# Patient Record
Sex: FEMALE | Employment: FULL TIME | URBAN - METROPOLITAN AREA
[De-identification: names, ages, dates, MRNs, and addresses within clinical notes are randomized per-mention and may not be internally consistent; named-entity substitution may affect disease eponyms.]

---

## 2022-06-21 ENCOUNTER — HOSPITAL ENCOUNTER (OUTPATIENT)
Age: 32
Discharge: HOME | End: 2022-06-21

## 2022-06-21 LAB — RUBV IGG SER QL: 100.5 IU/ML

## 2022-06-21 PROCEDURE — 86787 VARICELLA-ZOSTER ANTIBODY: CPT

## 2022-06-21 PROCEDURE — 86765 RUBEOLA ANTIBODY: CPT

## 2022-06-21 PROCEDURE — 36415 COLL VENOUS BLD VENIPUNCTURE: CPT

## 2022-06-21 PROCEDURE — 86735 MUMPS ANTIBODY: CPT

## 2022-06-21 PROCEDURE — 86481 TB AG RESPONSE T-CELL SUSP: CPT

## 2022-06-21 PROCEDURE — 86762 RUBELLA ANTIBODY: CPT

## 2022-06-22 LAB
MEASLES ANTIBODY IGG: 3.32
MUV IGG SER QL: 5.3
VZV IGG SER QL IA: 2.29

## 2022-06-25 LAB — T-SPOT TB TEST: NORMAL

## 2023-09-05 ENCOUNTER — HOSPITAL ENCOUNTER (OUTPATIENT)
Age: 33
Setting detail: SPECIMEN
Discharge: HOME | End: 2023-09-05
Payer: COMMERCIAL

## 2023-09-05 ENCOUNTER — OFFICE VISIT (OUTPATIENT)
Dept: OBGYN CLINIC | Age: 33
End: 2023-09-05
Payer: COMMERCIAL

## 2023-09-05 VITALS
DIASTOLIC BLOOD PRESSURE: 70 MMHG | HEIGHT: 64 IN | BODY MASS INDEX: 23.39 KG/M2 | SYSTOLIC BLOOD PRESSURE: 108 MMHG | WEIGHT: 137 LBS

## 2023-09-05 DIAGNOSIS — Z83.3 FAMILY HISTORY OF DIABETES MELLITUS (DM): ICD-10-CM

## 2023-09-05 DIAGNOSIS — Z32.01 POSITIVE PREGNANCY TEST: ICD-10-CM

## 2023-09-05 DIAGNOSIS — Z87.42 HISTORY OF PCOS: ICD-10-CM

## 2023-09-05 DIAGNOSIS — Z3A.10 10 WEEKS GESTATION OF PREGNANCY: ICD-10-CM

## 2023-09-05 DIAGNOSIS — N92.6 MISSED MENSES: ICD-10-CM

## 2023-09-05 DIAGNOSIS — Z32.01 POSITIVE PREGNANCY TEST: Primary | ICD-10-CM

## 2023-09-05 DIAGNOSIS — O21.9 NAUSEA/VOMITING IN PREGNANCY: ICD-10-CM

## 2023-09-05 LAB
ABO + RH BLD: NORMAL
BASOPHILS # BLD: <0.03 K/UL (ref 0–0.2)
BASOPHILS NFR BLD: 0 % (ref 0–2)
BLOOD GROUP ANTIBODIES SERPL: NEGATIVE
EOSINOPHIL # BLD: 0.15 K/UL (ref 0–0.44)
EOSINOPHILS RELATIVE PERCENT: 1 % (ref 1–4)
ERYTHROCYTE [DISTWIDTH] IN BLOOD BY AUTOMATED COUNT: 12.2 % (ref 11.8–14.4)
HCT VFR BLD AUTO: 38.8 % (ref 36.3–47.1)
HGB BLD-MCNC: 13.2 G/DL (ref 11.9–15.1)
HISTORY CHECK: NORMAL
HIV 1+2 AB+HIV1 P24 AG SERPL QL IA: NONREACTIVE
IMM GRANULOCYTES # BLD AUTO: 0.04 K/UL (ref 0–0.3)
IMM GRANULOCYTES NFR BLD: 0 %
LYMPHOCYTES NFR BLD: 2.58 K/UL (ref 1.1–3.7)
LYMPHOCYTES RELATIVE PERCENT: 22 % (ref 24–43)
MCH RBC QN AUTO: 32.1 PG (ref 25.2–33.5)
MCHC RBC AUTO-ENTMCNC: 34 G/DL (ref 28.4–34.8)
MCV RBC AUTO: 94.4 FL (ref 82.6–102.9)
MONOCYTES NFR BLD: 0.72 K/UL (ref 0.1–1.2)
MONOCYTES NFR BLD: 6 % (ref 3–12)
NEUTROPHILS NFR BLD: 71 % (ref 36–65)
NEUTS SEG NFR BLD: 8.25 K/UL (ref 1.5–8.1)
NRBC BLD-RTO: 0 PER 100 WBC
PLATELET # BLD AUTO: 395 K/UL (ref 138–453)
PMV BLD AUTO: 9 FL (ref 8.1–13.5)
RBC # BLD AUTO: 4.11 M/UL (ref 3.95–5.11)
WBC OTHER # BLD: 11.8 K/UL (ref 3.5–11.3)

## 2023-09-05 PROCEDURE — 99214 OFFICE O/P EST MOD 30 MIN: CPT

## 2023-09-05 RX ORDER — LANOLIN ALCOHOL/MO/W.PET/CERES
25 CREAM (GRAM) TOPICAL 3 TIMES DAILY PRN
Qty: 30 TABLET | Refills: 3 | Status: SHIPPED | OUTPATIENT
Start: 2023-09-05

## 2023-09-05 RX ORDER — ONDANSETRON 4 MG/1
TABLET, ORALLY DISINTEGRATING ORAL
COMMUNITY
Start: 2023-08-28

## 2023-09-05 RX ORDER — FAMOTIDINE 20 MG/1
TABLET, FILM COATED ORAL
COMMUNITY
Start: 2023-08-28

## 2023-09-05 ASSESSMENT — ENCOUNTER SYMPTOMS
CONSTIPATION: 1
VOMITING: 1
NAUSEA: 1

## 2023-09-05 NOTE — PROGRESS NOTES
SAB        IAB        Ectopic        Molar        Multiple        Live Births                   Past Medical History:   Diagnosis Date    Sciatica 2021     No past surgical history on file. Social History     Tobacco Use   Smoking Status Never   Smokeless Tobacco Never     Social History     Substance and Sexual Activity   Alcohol Use Not Currently     Current Outpatient Medications   Medication Sig Dispense Refill    vitamin B-6 (PYRIDOXINE) 50 MG tablet Take 0.5 tablets by mouth 3 times daily as needed (nausea, vomiting) 30 tablet 3    doxyLAMINE succinate (GNP SLEEP AID) 25 MG tablet Take 1 tablet by mouth nightly as needed for Sleep (or nausea) 30 tablet 1    famotidine (PEPCID) 20 MG tablet       ondansetron (ZOFRAN-ODT) 4 MG disintegrating tablet        No current facility-administered medications for this visit. Current BMI: 18.5-24.9 - Normal - Reviewed recommendations for weight gain in pregnancy. ALLERGIES:  Patient has no known allergies. Review of Systems   Constitutional:  Negative for chills and fever. Gastrointestinal:  Positive for constipation, nausea and vomiting (vomited 3-4 times this morning). Zofran is helpful. She does not need refills at this time   Genitourinary:  Positive for vaginal bleeding (small spotting with pap collection) and vaginal discharge. Negative for difficulty urinating, dysuria, frequency and pelvic pain. Neurological:  Negative for headaches. All other systems reviewed and are negative. Physical Exam  Constitutional:       General: She is not in acute distress. Appearance: Normal appearance. She is well-groomed. She is not ill-appearing. Comments: Pleasant and interactive   Genitourinary:      Vulva and urethral meatus normal.      No lesions in the vagina. Right Labia: No tenderness, lesions or skin changes. Left Labia: No tenderness, lesions or skin changes. Vaginal discharge present.       No vaginal tenderness or

## 2023-09-06 LAB
AMPHET UR QL SCN: NEGATIVE
BARBITURATES UR QL SCN: NEGATIVE
BENZODIAZ UR QL: NEGATIVE
BILIRUB UR QL STRIP: NEGATIVE
C TRACH DNA SPEC QL PROBE+SIG AMP: NEGATIVE
CANDIDA SPECIES: NEGATIVE
CANNABINOIDS UR QL SCN: NEGATIVE
CASTS #/AREA URNS LPF: NORMAL /LPF (ref 0–8)
CLARITY UR: ABNORMAL
COCAINE UR QL SCN: NEGATIVE
COLOR UR: ABNORMAL
EPI CELLS #/AREA URNS HPF: NORMAL /HPF (ref 0–5)
FENTANYL UR QL: NEGATIVE
GARDNERELLA VAGINALIS: NEGATIVE
GLUCOSE UR STRIP-MCNC: NEGATIVE MG/DL
HBV SURFACE AG SERPL QL IA: NONREACTIVE
HCV AB SERPL QL IA: NONREACTIVE
HGB UR QL STRIP.AUTO: NEGATIVE
KETONES UR STRIP-MCNC: NEGATIVE MG/DL
LEUKOCYTE ESTERASE UR QL STRIP: ABNORMAL
METHADONE UR QL: NEGATIVE
MICROORGANISM SPEC CULT: NO GROWTH
N GONORRHOEA DNA SPEC QL PROBE+SIG AMP: NEGATIVE
NITRITE UR QL STRIP: NEGATIVE
OPIATES UR QL SCN: NEGATIVE
OXYCODONE UR QL SCN: NEGATIVE
PCP UR QL SCN: NEGATIVE
PH UR STRIP: 5.5 [PH] (ref 5–8)
PROT UR STRIP-MCNC: NEGATIVE MG/DL
RBC #/AREA URNS HPF: NORMAL /HPF (ref 0–4)
RUBV IGG SERPL QL IA: 44.63 IU/ML
SOURCE: NORMAL
SP GR UR STRIP: 1.03 (ref 1–1.03)
SPECIMEN DESCRIPTION: NORMAL
SPECIMEN DESCRIPTION: NORMAL
T PALLIDUM AB SER QL IA: NONREACTIVE
TEST INFORMATION: NORMAL
TRICHOMONAS: NEGATIVE
UROBILINOGEN UR STRIP-ACNC: NORMAL EU/DL (ref 0–1)
WBC #/AREA URNS HPF: NORMAL /HPF (ref 0–5)

## 2023-09-11 LAB
CFTR ALLELE 1 BLD/T QL: NEGATIVE
CFTR ALLELE 2 BLD/T QL: NEGATIVE
CFTR MUT ANL BLD/T: NORMAL
CFTR MUT ANL BLD/T: NORMAL

## 2023-09-12 DIAGNOSIS — Z12.4 CERVICAL CANCER SCREENING: Primary | ICD-10-CM

## 2023-09-20 ENCOUNTER — INITIAL PRENATAL (OUTPATIENT)
Dept: OBGYN CLINIC | Age: 33
End: 2023-09-20

## 2023-09-20 ENCOUNTER — HOSPITAL ENCOUNTER (OUTPATIENT)
Age: 33
Setting detail: SPECIMEN
Discharge: HOME | End: 2023-09-20

## 2023-09-20 VITALS
WEIGHT: 142.6 LBS | BODY MASS INDEX: 24.34 KG/M2 | SYSTOLIC BLOOD PRESSURE: 122 MMHG | DIASTOLIC BLOOD PRESSURE: 72 MMHG | HEIGHT: 64 IN

## 2023-09-20 DIAGNOSIS — Z83.3 FH: DIABETES MELLITUS: ICD-10-CM

## 2023-09-20 DIAGNOSIS — Z82.49 FAMILY HISTORY OF HYPERTENSION IN MOTHER: ICD-10-CM

## 2023-09-20 DIAGNOSIS — Z83.3 FAMILY HISTORY OF DIABETES MELLITUS (DM): ICD-10-CM

## 2023-09-20 DIAGNOSIS — Z3A.10 10 WEEKS GESTATION OF PREGNANCY: ICD-10-CM

## 2023-09-20 DIAGNOSIS — Z34.01 PRIMIPARITY, FIRST TRIMESTER: ICD-10-CM

## 2023-09-20 DIAGNOSIS — Z83.49 FAMILY HISTORY OF G6PD: ICD-10-CM

## 2023-09-20 DIAGNOSIS — Z3A.12 12 WEEKS GESTATION OF PREGNANCY: Primary | ICD-10-CM

## 2023-09-20 PROBLEM — Z83.2 FAMILY HISTORY OF G6PD: Status: ACTIVE | Noted: 2023-09-20

## 2023-09-20 LAB
GLUCOSE 1H P 50 G GLC PO SERPL-MCNC: 131 MG/DL (ref 70–135)
GLUCOSE ADMINISTRATION: NORMAL

## 2023-09-20 RX ORDER — DOCUSATE SODIUM 100 MG/1
100 CAPSULE, LIQUID FILLED ORAL DAILY PRN
COMMUNITY

## 2023-09-20 NOTE — PROGRESS NOTES
Relationship with FOB: , living together, first pregnancy together  Partner's name:Trish   Plans to Breast fdg  Pain Score:0/10 but occass ligament pain  Job title:RN   This is a planned pregnancy:Yes  Certain LMP:Yes  S/S of pregnancy:Yes, missed period, breast tenderness and nausea  Hx N/V pregnancy:Nausea and vomiting. Hx vomiting everything early on but improving. Last episode of emesis yesterday. Mother's ethnicity:   Father's ethnicity:      There is no problem list on file for this patient. Blood pressure 122/72, height 5' 4\" (1.626 m), weight 142 lb 9.6 oz (64.7 kg), last menstrual period 2023. Arpan Hanson is a 35 y.o. , here for her ACOG. The patients past medical, surgical, social and family history were reviewed. Current medications and allergies were reviewed, and documented in the chart. Menstrual history: irreg, due to PCOS  Birth control: none. Wt Readings from Last 3 Encounters:   23 142 lb 9.6 oz (64.7 kg)   23 137 lb (62.1 kg)     Recent Results (from the past 8736 hour(s))   GYN Cytology    Collection Time: 23 12:00 AM   Result Value Ref Range    Cytology Report       Path Number: [id number]    DIAGNOSIS    Imaged ThinPrep Pap - Cervical (1 monolayer slide):  Specimen Adequacy:       Satisfactory for evaluation.       - Endocervical/transformation zone component present. Descriptive Diagnosis:       Negative for intraepithelial lesion or malignancy. Comments:       Specimen was screened at [facility], [address] [address]      [employer] Screener:  [place]     **Electronically Signed OutJuMARY CARMEN Howard(ASCP)  [place]/9/10/2023    Procedure/Addendum  HPV Procedure Report       Date Ordered:     2023     Status: Signed Out       Date Complete:     9/15/2023     By: System Interface       Date Reported:     9/15/2023              Sample:  HPV Type 16      Result:   Not Detected      Ref

## 2023-09-26 LAB
Lab: NORMAL
NTRA FETAL FRACTION: NORMAL
NTRA GENDER OF FETUS: NORMAL
NTRA MONOSOMY X AGE-BASED RISK TEXT: NORMAL
NTRA MONOSOMY X RESULT TEXT: NORMAL
NTRA MONOSOMY X RISK SCORE TEXT: NORMAL
NTRA TRIPLOIDY RESULT TEXT: NORMAL
NTRA TRISOMY 13 AGE-BASED RISK TEXT: NORMAL
NTRA TRISOMY 13 RESULT TEXT: NORMAL
NTRA TRISOMY 13 RISK SCORE TEXT: NORMAL
NTRA TRISOMY 18 AGE-BASED RISK TEXT: NORMAL
NTRA TRISOMY 18 RESULT TEXT: NORMAL
NTRA TRISOMY 18 RISK SCORE TEXT: NORMAL
NTRA TRISOMY 21 AGE-BASED RISK TEXT: NORMAL
NTRA TRISOMY 21 RESULT TEXT: NORMAL
NTRA TRISOMY 21 RISK SCORE TEXT: NORMAL

## 2023-09-28 ENCOUNTER — TELEPHONE (OUTPATIENT)
Dept: OBGYN CLINIC | Age: 33
End: 2023-09-28

## 2023-09-28 NOTE — TELEPHONE ENCOUNTER
13wks 6ds    Pt informed on results of nipit test and 1hr glucose.     Pt had verbal understanding of results     Pt informed of 3 hr glucose test and told to fast from midnight the night before

## 2023-10-05 ENCOUNTER — HOSPITAL ENCOUNTER (OUTPATIENT)
Age: 33
Setting detail: SPECIMEN
Discharge: HOME | End: 2023-10-05

## 2023-10-05 DIAGNOSIS — R73.09 GLUCOSE TOLERANCE TEST ABNORMAL: ICD-10-CM

## 2023-10-06 LAB
AMOUNT GLUCOSE GIVEN: 100 G
GLUCOSE 2H P 75 G GLC PO SERPL-MCNC: 83 MG/DL (ref 65–99)
GLUCOSE TOLERANCE TEST 1 HOUR: 181 MG/DL (ref 65–184)
GLUCOSE TOLERANCE TEST 2 HOUR: 127 MG/DL (ref 65–139)
GLUCOSE TOLERANCE TEST 3 HOUR: 91 MG/DL (ref 65–130)

## 2023-10-23 SDOH — ECONOMIC STABILITY: FOOD INSECURITY: WITHIN THE PAST 12 MONTHS, THE FOOD YOU BOUGHT JUST DIDN'T LAST AND YOU DIDN'T HAVE MONEY TO GET MORE.: NEVER TRUE

## 2023-10-23 SDOH — ECONOMIC STABILITY: INCOME INSECURITY: HOW HARD IS IT FOR YOU TO PAY FOR THE VERY BASICS LIKE FOOD, HOUSING, MEDICAL CARE, AND HEATING?: NOT VERY HARD

## 2023-10-23 SDOH — ECONOMIC STABILITY: HOUSING INSECURITY
IN THE LAST 12 MONTHS, WAS THERE A TIME WHEN YOU DID NOT HAVE A STEADY PLACE TO SLEEP OR SLEPT IN A SHELTER (INCLUDING NOW)?: NO

## 2023-10-23 SDOH — ECONOMIC STABILITY: TRANSPORTATION INSECURITY
IN THE PAST 12 MONTHS, HAS LACK OF TRANSPORTATION KEPT YOU FROM MEETINGS, WORK, OR FROM GETTING THINGS NEEDED FOR DAILY LIVING?: NO

## 2023-10-23 SDOH — ECONOMIC STABILITY: FOOD INSECURITY: WITHIN THE PAST 12 MONTHS, YOU WORRIED THAT YOUR FOOD WOULD RUN OUT BEFORE YOU GOT MONEY TO BUY MORE.: NEVER TRUE

## 2023-10-24 PROBLEM — Z34.90 SUPERVISION OF NORMAL PREGNANCY: Status: ACTIVE | Noted: 2023-10-24

## 2023-10-24 NOTE — PROGRESS NOTES
Tamiko Banerjee is a  @ 17w5d who presents for TIANA visit. She denies LOF, VB or Ctxs.  - FM. She says her nausea is much better and her GERD has resolved and she hasn't needed any meds. She says she feels much better overall, but is having some sciatic pain. She denies any fevers/chills, SOB, cough, sore throat, loss of taste/smell or sick contacts. Pt denies any HA, vision changes or RUQ pain. O:  Vitals:    10/25/23 0932   BP: 113/69   Pulse: 69     Gen: NAD  Abd: soft, nontender, gravid  Ext:  no edema      BP: 113/69  Weight - Scale: 66.7 kg (147 lb)  Pulse: 69  Patient Position: Sitting  Fetal HR: 140  Movement: Absent    A/P:  Patient Active Problem List    Diagnosis Date Noted    Family history of G6PD 2023     Priority: Low     OB EDC: 24  FH and pt desires testing at [facility] MFM:referral placed 23      Supervision of normal pregnancy 10/24/2023     Discussed updated COVID precautions and policies. Reviewed updated visitor policy. Encouraged social distancing and appropriate hand washing/hygiene practices. Reviewed symptoms suspicious for COVID infection. Discussed that ACOG, SMFM, and the CDC recommend to not withold immunization in pregnant and breastfeeding women who meet criteria for receipt of the vaccine based on the ACIP recommended priority groups. All questions answered. Patient vocalized understanding.     Encouraged pt to take baby ASA  Encouraged massage and exercises for sciatic nerve pain  Discussed s/sx that should prompt call to the office  Discussed kick counts  Pt counseled to continue PNVs  RTC in 4 wks    Vishnu Peña MD

## 2023-10-25 ENCOUNTER — ROUTINE PRENATAL (OUTPATIENT)
Dept: OBGYN CLINIC | Age: 33
End: 2023-10-25

## 2023-10-25 VITALS
DIASTOLIC BLOOD PRESSURE: 69 MMHG | HEART RATE: 69 BPM | BODY MASS INDEX: 25.23 KG/M2 | WEIGHT: 147 LBS | SYSTOLIC BLOOD PRESSURE: 113 MMHG

## 2023-10-25 DIAGNOSIS — Z34.02 ENCOUNTER FOR SUPERVISION OF NORMAL FIRST PREGNANCY IN SECOND TRIMESTER: ICD-10-CM

## 2023-10-25 DIAGNOSIS — Z3A.17 17 WEEKS GESTATION OF PREGNANCY: Primary | ICD-10-CM

## 2023-10-25 PROCEDURE — 0502F SUBSEQUENT PRENATAL CARE: CPT | Performed by: OBSTETRICS & GYNECOLOGY

## 2023-11-11 PROBLEM — Z83.3 FAMILY HISTORY OF DIABETES MELLITUS: Status: ACTIVE | Noted: 2023-11-11

## 2023-11-11 NOTE — PROGRESS NOTES
Prenatal Visit    Chante Polanco is a 35 y.o. female  at [address]    The patient was seen and evaluated. She has no complaints today. Having some insomnia. 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 Reports fetal flutters. She denies headache, vision changes, RUQ pain, contractions, vaginal bleeding and leakage of fluid. Still taking her vitamins daily. The problem list reflects the active issues addressed during today's visit    Vitals:    BP: (!) 96/57  Weight - Scale: 70.9 kg (156 lb 3.2 oz)  Pulse: 71  Fetal HR: 131     PHYSICAL:   General appearance: no apparent distress, alert and cooperative  HEENT: head atraumatic, normocephalic, trachea midline, moist mucous membranes   Neurologic: alert, oriented, normal speech   Lungs: no increased work of breathing,   Abdomen: soft, gravid, non-tender on palpation    Musculoskeletal: no gross abnormalities, range of motion appropriate for age   Psychiatric: mood appropriate, normal affect      Assessment & Plan:  Chante Polanco is a 35 y.o. female  at 24w10d IUP   - VSS     - Prenatal labs reviewed    - NIPT reviewed & low risk    - A single marker MSAFP was discussed and patient wants to wait until after her anatomy scan since that is scheduled today at 1400 with [facility]    - Continue taking prenatal vitamins QD    - Rhogam not indicated this pregnancy   - Influenza vaccination: due this season    - COVID-19 vaccination: R/B/A discussed with increased risk of both maternal and fetal morbidity and mortality in unvaccinated pregnant patients who contract COVID-19- patient is undecided today   - Discussed establishing healthy sleep habits to help get into a good routine    - Advise she can take 3 mg melatonin for a sleep aide   Return in about 4 weeks (around 2023) for TIANA.           Patient Active Problem List    Diagnosis Date Noted    Family history of G6PD 2023     Priority: Low     OB EDC: 24  FH and pt desires

## 2023-11-15 ENCOUNTER — ROUTINE PRENATAL (OUTPATIENT)
Dept: OBGYN CLINIC | Age: 33
End: 2023-11-15

## 2023-11-15 ENCOUNTER — ROUTINE PRENATAL (OUTPATIENT)
Dept: PERINATAL CARE | Age: 33
End: 2023-11-15
Payer: COMMERCIAL

## 2023-11-15 VITALS
SYSTOLIC BLOOD PRESSURE: 96 MMHG | BODY MASS INDEX: 26.81 KG/M2 | HEART RATE: 71 BPM | DIASTOLIC BLOOD PRESSURE: 57 MMHG | WEIGHT: 156.2 LBS

## 2023-11-15 VITALS
BODY MASS INDEX: 26.8 KG/M2 | RESPIRATION RATE: 16 BRPM | TEMPERATURE: 97.3 F | DIASTOLIC BLOOD PRESSURE: 71 MMHG | WEIGHT: 156.97 LBS | SYSTOLIC BLOOD PRESSURE: 122 MMHG | HEART RATE: 71 BPM | HEIGHT: 64 IN

## 2023-11-15 DIAGNOSIS — Z3A.20 20 WEEKS GESTATION OF PREGNANCY: ICD-10-CM

## 2023-11-15 DIAGNOSIS — Z36.86 ENCOUNTER FOR SCREENING FOR RISK OF PRE-TERM LABOR: ICD-10-CM

## 2023-11-15 DIAGNOSIS — Z83.3 FAMILY HISTORY OF DIABETES MELLITUS: ICD-10-CM

## 2023-11-15 DIAGNOSIS — O99.810 ABNORMAL MATERNAL GLUCOSE TOLERANCE, ANTEPARTUM: Primary | ICD-10-CM

## 2023-11-15 DIAGNOSIS — O09.92 HIGH-RISK PREGNANCY IN SECOND TRIMESTER: Primary | ICD-10-CM

## 2023-11-15 DIAGNOSIS — O35.2XX0 HEREDITARY FAMILIAL DISEASE AFFECTING MANAGEMENT OF MOTHER AND POSSIBLY AFFECTING FETUS, ANTEPARTUM, SINGLE OR UNSPECIFIED FETUS: ICD-10-CM

## 2023-11-15 LAB
ABDOMINAL CIRCUMFERENCE: NORMAL
ABDOMINAL CIRCUMFERENCE: NORMAL
BIPARIETAL DIAMETER: NORMAL
BIPARIETAL DIAMETER: NORMAL
ESTIMATED FETAL WEIGHT: NORMAL
ESTIMATED FETAL WEIGHT: NORMAL
FEMORAL DIAMETER: NORMAL
FEMORAL DIAMETER: NORMAL
HC/AC: NORMAL
HC/AC: NORMAL
HEAD CIRCUMFERENCE: NORMAL
HEAD CIRCUMFERENCE: NORMAL

## 2023-11-15 PROCEDURE — 99204 OFFICE O/P NEW MOD 45 MIN: CPT | Performed by: OBSTETRICS & GYNECOLOGY

## 2023-11-15 PROCEDURE — 76817 TRANSVAGINAL US OBSTETRIC: CPT | Performed by: OBSTETRICS & GYNECOLOGY

## 2023-11-15 PROCEDURE — 76811 OB US DETAILED SNGL FETUS: CPT | Performed by: OBSTETRICS & GYNECOLOGY

## 2023-11-15 PROCEDURE — 0502F SUBSEQUENT PRENATAL CARE: CPT | Performed by: STUDENT IN AN ORGANIZED HEALTH CARE EDUCATION/TRAINING PROGRAM

## 2023-12-26 ENCOUNTER — ROUTINE PRENATAL (OUTPATIENT)
Dept: OBGYN CLINIC | Age: 33
End: 2023-12-26

## 2023-12-26 VITALS
BODY MASS INDEX: 28.68 KG/M2 | HEART RATE: 73 BPM | SYSTOLIC BLOOD PRESSURE: 116 MMHG | HEIGHT: 64 IN | DIASTOLIC BLOOD PRESSURE: 64 MMHG | WEIGHT: 168 LBS

## 2023-12-26 DIAGNOSIS — O09.92 HIGH-RISK PREGNANCY IN SECOND TRIMESTER: Primary | ICD-10-CM

## 2023-12-26 DIAGNOSIS — R12 HEARTBURN DURING PREGNANCY IN THIRD TRIMESTER: ICD-10-CM

## 2023-12-26 DIAGNOSIS — Z3A.26 26 WEEKS GESTATION OF PREGNANCY: ICD-10-CM

## 2023-12-26 DIAGNOSIS — Z83.49 FAMILY HISTORY OF G6PD: ICD-10-CM

## 2023-12-26 DIAGNOSIS — O26.893 HEARTBURN DURING PREGNANCY IN THIRD TRIMESTER: ICD-10-CM

## 2023-12-26 PROCEDURE — 0502F SUBSEQUENT PRENATAL CARE: CPT | Performed by: STUDENT IN AN ORGANIZED HEALTH CARE EDUCATION/TRAINING PROGRAM

## 2023-12-26 RX ORDER — CALCIUM CARBONATE 500 MG/1
1 TABLET, CHEWABLE ORAL DAILY
Qty: 30 TABLET | Refills: 6 | Status: SHIPPED | OUTPATIENT
Start: 2023-12-26

## 2023-12-26 NOTE — PROGRESS NOTES
Prenatal Visit    Suzanna Powell is a 35 y.o. female  at 28w1d IUP    The patient was seen and evaluated. She has no complaints today. States she is having some worsening heart burn. She just started taking pepcid for relief. Reports positive fetal movements. She denies headache, vision changes, RUQ pain, contractions, vaginal bleeding and leakage of fluid. The problem list reflects the active issues addressed during today's visit    Vitals:     BP: 116/64  Weight - Scale: 76.2 kg (168 lb)  Pulse: 73  Fundal Height (cm): 26 cm  Fetal HR: 147     PHYSICAL:   General appearance: no apparent distress, alert and cooperative  HEENT: head atraumatic, normocephalic, trachea midline, moist mucous membranes   Neurologic: alert, oriented, normal speech   Lungs: no increased work of breathing,   Abdomen: soft, gravid, non-tender on palpation    Musculoskeletal: no gross abnormalities, range of motion appropriate for age   Psychiatric: mood appropriate, normal affect      Assessment & Plan:  Suzanna Powell is a 35 y.o. female  at 28w1d IUP   - VSS     - 28 week labs ordered, explained repeating 3 hour GTT    - Prenatal labs reviewed    - NIPT reviewed and low risk     - The patients anatomy ultrasound has been completed and reviewed with patient. - Continue taking prenatal vitamins QD   - Influenza vaccination: done   - TDAP: due next visit   - Rhogam not indicated this pregnancy    - COVID-19 vaccination: R/B/A discussed with increased risk of both maternal and fetal morbidity and mortality in unvaccinated pregnant patients who contract COVID-19- patient is undecided today  - F/u as clinically indicated with MFM   - Rx TUMS  with refills sent to pharmacy. Discussed dietary changes and lifestyle modifications to help relieve GERD. Continue taking pepcid   Return in about 2 weeks (around 2024) for TIANA.           Patient Active Problem List    Diagnosis Date Noted    Family history of G6PD 2023

## 2024-01-02 ENCOUNTER — HOSPITAL ENCOUNTER (OUTPATIENT)
Age: 34
Setting detail: SPECIMEN
Discharge: HOME | End: 2024-01-02

## 2024-01-02 DIAGNOSIS — O09.92 HIGH-RISK PREGNANCY IN SECOND TRIMESTER: ICD-10-CM

## 2024-01-02 DIAGNOSIS — Z3A.26 26 WEEKS GESTATION OF PREGNANCY: ICD-10-CM

## 2024-01-02 LAB
BASOPHILS # BLD: 0.05 K/UL (ref 0–0.2)
BASOPHILS NFR BLD: 0 % (ref 0–2)
EOSINOPHIL # BLD: 0.22 K/UL (ref 0–0.44)
EOSINOPHILS RELATIVE PERCENT: 1 % (ref 1–4)
ERYTHROCYTE [DISTWIDTH] IN BLOOD BY AUTOMATED COUNT: 11.8 % (ref 11.8–14.4)
HCT VFR BLD AUTO: 35.8 % (ref 36.3–47.1)
HGB BLD-MCNC: 12 G/DL (ref 11.9–15.1)
IMM GRANULOCYTES # BLD AUTO: 0.14 K/UL (ref 0–0.3)
IMM GRANULOCYTES NFR BLD: 1 %
LYMPHOCYTES NFR BLD: 2.83 K/UL (ref 1.1–3.7)
LYMPHOCYTES RELATIVE PERCENT: 18 % (ref 24–43)
MCH RBC QN AUTO: 32.7 PG (ref 25.2–33.5)
MCHC RBC AUTO-ENTMCNC: 33.5 G/DL (ref 28.4–34.8)
MCV RBC AUTO: 97.5 FL (ref 82.6–102.9)
MONOCYTES NFR BLD: 0.98 K/UL (ref 0.1–1.2)
MONOCYTES NFR BLD: 6 % (ref 3–12)
NEUTROPHILS NFR BLD: 74 % (ref 36–65)
NEUTS SEG NFR BLD: 11.46 K/UL (ref 1.5–8.1)
NRBC BLD-RTO: 0 PER 100 WBC
PLATELET # BLD AUTO: 448 K/UL (ref 138–453)
PMV BLD AUTO: 9.1 FL (ref 8.1–13.5)
RBC # BLD AUTO: 3.67 M/UL (ref 3.95–5.11)
WBC OTHER # BLD: 15.7 K/UL (ref 3.5–11.3)

## 2024-01-03 LAB
AMOUNT GLUCOSE GIVEN: 100 G
GLUCOSE 2H P 75 G GLC PO SERPL-MCNC: 81 MG/DL (ref 65–99)
GLUCOSE TOLERANCE TEST 1 HOUR: 155 MG/DL
GLUCOSE TOLERANCE TEST 2 HOUR: 151 MG/DL
GLUCOSE TOLERANCE TEST 3 HOUR: 103 MG/DL (ref 65–130)

## 2024-01-09 NOTE — PROGRESS NOTES
Gisela is a  @ 28w5d who presents for TIANA visit.  She denies LOF, VB or Ctxs.  + FM.  She is having some leg cramps.  She denies any fevers/chills, SOB, cough, sore throat, loss of taste/smell or sick contacts.  Pt denies any HA, vision changes or RUQ pain.     O:  Vitals:    01/10/24 0834   BP: 120/74   Pulse: 79     Gen: NAD  Abd: soft, nontender, gravid  Ext:  no edema      BP: 120/74  Weight - Scale: 78 kg (172 lb)  Pulse: 79  Patient Position: Sitting  Fundal Height (cm): 29 cm  Fetal HR: 140  Movement: Present    A/P:  Patient Active Problem List    Diagnosis Date Noted    Family history of G6PD 2023     Priority: Low     OB EDC: 24  FH and pt desires testing at  MFM:referral placed 23      Family history of diabetes mellitus 2023     Patients mother  Failed early 1 hr GTT, passed early 3 hour GTT       Supervision of normal pregnancy 10/24/2023     - Discussed updated COVID precautions and policies. Reviewed updated visitor policy. Encouraged social distancing and appropriate hand washing/hygiene practices. Reviewed symptoms suspicious for COVID infection. Discussed that ACOG, SMFM, and the CDC recommend to not withold immunization in pregnant and breastfeeding women who meet criteria for receipt of the vaccine based on the ACIP recommended priority groups. All questions answered. Patient vocalized understanding.    - RSV vaccination (32-36 weeks): The patient was counseled on benefits to her baby if she receives the Pfizer RSV vaccine (Abrysvo) during pregnancy. She was informed that this vaccination is FDA approved for use during pregnancy. She will think about it and call local pharmacies       Tdap next visit  Encouraged magnesium and compression socks  Discussed s/sx that should prompt call to the office  Discussed kick counts  Pt counseled to continue PNVs  RTC in 2 wks    Amy Mccarty MD

## 2024-01-10 ENCOUNTER — ROUTINE PRENATAL (OUTPATIENT)
Dept: OBGYN CLINIC | Age: 34
End: 2024-01-10

## 2024-01-10 VITALS
WEIGHT: 172 LBS | SYSTOLIC BLOOD PRESSURE: 120 MMHG | HEART RATE: 79 BPM | BODY MASS INDEX: 29.52 KG/M2 | DIASTOLIC BLOOD PRESSURE: 74 MMHG

## 2024-01-10 DIAGNOSIS — Z34.03 ENCOUNTER FOR SUPERVISION OF NORMAL FIRST PREGNANCY IN THIRD TRIMESTER: ICD-10-CM

## 2024-01-10 DIAGNOSIS — Z3A.28 28 WEEKS GESTATION OF PREGNANCY: Primary | ICD-10-CM

## 2024-01-10 PROCEDURE — 0502F SUBSEQUENT PRENATAL CARE: CPT | Performed by: OBSTETRICS & GYNECOLOGY

## 2024-01-23 NOTE — PROGRESS NOTES
Gisela is a  @ 30w5d who presents for TIANA visit.  She denies LOF, VB or Ctxs.  + FM.  She is feeling a lot of punching and pelvic pressure.  She denies any fevers/chills, SOB, cough, sore throat, loss of taste/smell or sick contacts.  Pt denies any HA, vision changes or RUQ pain.     O:  Vitals:    24 1044   BP: 122/66   Pulse: 84     Gen: NAD  Abd: soft, nontender, gravid  Ext:  no edema      BP: 122/66  Weight - Scale: 81.2 kg (179 lb)  Pulse: 84  Patient Position: Sitting  Movement: Present    A/P:  Patient Active Problem List    Diagnosis Date Noted    Family history of G6PD 2023     Priority: Low     OB EDC: 24  FH and pt desires testing at  MFM:referral placed 23      Family history of diabetes mellitus 2023     Patients mother  Failed early 1 hr GTT, passed early 3 hour GTT       Supervision of normal pregnancy 10/24/2023     - Discussed updated COVID precautions and policies. Reviewed updated visitor policy. Encouraged social distancing and appropriate hand washing/hygiene practices. Reviewed symptoms suspicious for COVID infection. Discussed that ACOG, SMFM, and the CDC recommend to not withold immunization in pregnant and breastfeeding women who meet criteria for receipt of the vaccine based on the ACIP recommended priority groups. All questions answered. Patient vocalized understanding.    - RSV vaccination (32-36 weeks): The patient was counseled on benefits to her baby if she receives the Pfizer RSV vaccine (Abrysvo) during pregnancy. She was informed that this vaccination is FDA approved for use during pregnancy. She will think about it and call local pharmacies       Tdap vaccine today  Discussed s/sx that should prompt call to the office  Discussed kick counts  Pt counseled to continue PNVs  RTC in 2 wks    Amy Mccarty MD

## 2024-01-24 ENCOUNTER — ROUTINE PRENATAL (OUTPATIENT)
Dept: OBGYN CLINIC | Age: 34
End: 2024-01-24
Payer: COMMERCIAL

## 2024-01-24 VITALS
WEIGHT: 179 LBS | HEART RATE: 84 BPM | SYSTOLIC BLOOD PRESSURE: 122 MMHG | BODY MASS INDEX: 30.73 KG/M2 | DIASTOLIC BLOOD PRESSURE: 66 MMHG

## 2024-01-24 DIAGNOSIS — Z34.03 ENCOUNTER FOR SUPERVISION OF NORMAL FIRST PREGNANCY IN THIRD TRIMESTER: ICD-10-CM

## 2024-01-24 DIAGNOSIS — Z3A.30 30 WEEKS GESTATION OF PREGNANCY: Primary | ICD-10-CM

## 2024-01-24 DIAGNOSIS — Z23 NEED FOR TDAP VACCINATION: ICD-10-CM

## 2024-01-24 PROCEDURE — 90471 IMMUNIZATION ADMIN: CPT | Performed by: OBSTETRICS & GYNECOLOGY

## 2024-01-24 PROCEDURE — 90715 TDAP VACCINE 7 YRS/> IM: CPT | Performed by: OBSTETRICS & GYNECOLOGY

## 2024-01-24 PROCEDURE — 0502F SUBSEQUENT PRENATAL CARE: CPT | Performed by: OBSTETRICS & GYNECOLOGY

## 2024-01-24 NOTE — PROGRESS NOTES
After obtaining verbal consent, and per orders of Dr. Amy Mccarty, injection of Tdap 0.5mL given in Left deltoid IM by Yesenia Mac. Patient instructed to remain in clinic for 20 minutes afterwards, and to report any adverse reaction to me immediately.

## 2024-01-31 ENCOUNTER — PATIENT MESSAGE (OUTPATIENT)
Dept: OBGYN CLINIC | Age: 34
End: 2024-01-31

## 2024-02-07 ENCOUNTER — ROUTINE PRENATAL (OUTPATIENT)
Dept: OBGYN CLINIC | Age: 34
End: 2024-02-07

## 2024-02-07 VITALS
BODY MASS INDEX: 30.9 KG/M2 | WEIGHT: 180 LBS | DIASTOLIC BLOOD PRESSURE: 71 MMHG | SYSTOLIC BLOOD PRESSURE: 118 MMHG | HEART RATE: 92 BPM

## 2024-02-07 DIAGNOSIS — Z3A.32 32 WEEKS GESTATION OF PREGNANCY: ICD-10-CM

## 2024-02-07 DIAGNOSIS — Z34.03 ENCOUNTER FOR SUPERVISION OF NORMAL FIRST PREGNANCY IN THIRD TRIMESTER: Primary | ICD-10-CM

## 2024-02-07 NOTE — PROGRESS NOTES
Prenatal Visit    Gisela Girard is a 34 y.o. female  at 32w5d IU    The patient was seen and evaluated. She is here today with her partner. 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 She reports positive fetal movements. She denies contractions, vaginal bleeding and leakage of fluid. Signs and symptoms of labor and pre-eclampsia were reviewed with the patient in detail. She is to report any of these if they occur. She currently denies any of these.    The problem list reflects the active issues addressed during today's visit    Vitals:     BP: 118/71  Weight - Scale: 81.6 kg (180 lb)  Pulse: 92  Patient Position: Sitting  Fundal Height (cm): 32 cm  Fetal HR: 145  Movement: Present     PHYSICAL:   General appearance: no apparent distress, alert and cooperative  HEENT: head atraumatic, normocephalic, trachea midline, moist mucous membranes   Neurologic: alert, oriented, normal speech   Lungs: no increased work of breathing,   Abdomen: soft, gravid, non-tender on palpation    Musculoskeletal: no gross abnormalities, range of motion appropriate for age   Psychiatric: mood appropriate, normal affect      Assessment & Plan:  Gisela Girard is a 34 y.o. female  at 32w5d   - VSS     - 28 week completed                - NIPT reviewed and low risk                 - Continue taking prenatal vitamins QD   - Influenza vaccination: done   - TDAP: done   - Rhogam not indicated this pregnancy               - COVID-19 vaccination: Recommend booster during pregnancy   - F/u as clinically indicated with MFM    -  testing indication: none    - Work note given to put her on light duty where she cannot lift more than 20 lbs   Return in about 2 weeks (around 2024) for TIANA.    Counseling:   - Warning signs reviewed and recommendations when to call or present to the hospital if she experiences signs or symptoms of

## 2024-02-14 ENCOUNTER — TELEPHONE (OUTPATIENT)
Dept: OBGYN CLINIC | Age: 34
End: 2024-02-14

## 2024-02-14 NOTE — TELEPHONE ENCOUNTER
Spoke with Dr. Michel. Not recommended per ACOG guidelines for this pt because she'll not be delivering in peal season for RSV for baby. If she wants to received it she may have it.  Pt states she doesn't really want to get it.

## 2024-02-14 NOTE — TELEPHONE ENCOUNTER
Pt requires rx to obtains RSV vaccine at pharmacy.       [facility]  [address] [address]

## 2024-02-21 ENCOUNTER — ROUTINE PRENATAL (OUTPATIENT)
Dept: OBGYN CLINIC | Age: 34
End: 2024-02-21

## 2024-02-21 VITALS
WEIGHT: 184.5 LBS | SYSTOLIC BLOOD PRESSURE: 112 MMHG | DIASTOLIC BLOOD PRESSURE: 69 MMHG | BODY MASS INDEX: 31.67 KG/M2 | HEART RATE: 83 BPM

## 2024-02-21 DIAGNOSIS — Z3A.34 34 WEEKS GESTATION OF PREGNANCY: ICD-10-CM

## 2024-02-21 DIAGNOSIS — O09.93 HIGH-RISK PREGNANCY IN THIRD TRIMESTER: Primary | ICD-10-CM

## 2024-02-21 PROCEDURE — 0502F SUBSEQUENT PRENATAL CARE: CPT | Performed by: STUDENT IN AN ORGANIZED HEALTH CARE EDUCATION/TRAINING PROGRAM

## 2024-02-21 NOTE — PROGRESS NOTES
Prenatal Visit    Gisela Girard is a 34 y.o. female  at 34w5d IUP    The patient was seen and evaluated. She has no complaints today. She is considering starting her FMLA at 38 weeks.  She reports positive fetal movements. She denies contractions, vaginal bleeding and leakage of fluid. Signs and symptoms of labor and pre-eclampsia were reviewed with the patient in detail. She is to report any of these if they occur. She currently denies any of these. She has been wearing an abdominal binder to help with her increased pelvic pressure.     The problem list reflects the active issues addressed during today's visit    Vitals:    BP: 112/69  Weight - Scale: 83.7 kg (184 lb 8 oz)  Pulse: 83  Patient Position: Sitting  Fundal Height (cm): 34 cm  Fetal HR: 138  Movement: Present (Simultaneous filing. User may not have seen previous data.)     PHYSICAL:   General appearance: no apparent distress, alert and cooperative  HEENT: head atraumatic, normocephalic, trachea midline, moist mucous membranes   Neurologic: alert, oriented, normal speech   Lungs: no increased work of breathing,   Abdomen: soft, gravid, non-tender on palpation    Musculoskeletal: no gross abnormalities, range of motion appropriate for age   Psychiatric: mood appropriate, normal affect      Assessment & Plan:  Gisela Girard is a 34 y.o. female  at 34w5d   - VSS     - Will get GBS at next visit    - 28 week completed                 - Continue taking prenatal vitamins QD   - Influenza vaccination: done   - TDAP: done   - Rhogam not indicated this pregnancy               - COVID-19 vaccination: Recommend booster during pregnancy   - F/u as clinically indicated with MFM               -  testing indication: none    - Discussed using kt physio tape along with belly binder during work shifts   Return in about 2 weeks (around 3/6/2024) for TIANA.    Counseling:   - Warning signs reviewed and recommendations when to call or present to the

## 2024-03-05 NOTE — PROGRESS NOTES
Gisela is a  @ 36w5d who presents for TIANA visit.  She denies LOF, VB or Ctxs.  + FM.  She is having a lot of pelvic pressure and pain.  She denies any fevers/chills, SOB, cough, sore throat, loss of taste/smell or sick contacts.  Pt denies any HA, vision changes or RUQ pain.     O:  Vitals:    24 0814   BP: 106/76   Pulse: 81     Gen: NAD  Abd: soft, nontender, gravid  Ext:  no edema      BP: 106/76  Weight - Scale: 85.7 kg (189 lb)  Pulse: 81  Patient Position: Sitting  Fundal Height (cm): 36 cm  Fetal HR: 145  Movement: Present    A/P:  Patient Active Problem List    Diagnosis Date Noted    Family history of G6PD 2023     Priority: Low     OB EDC: 24  FH and pt desires testing at  MFM:referral placed 23      Family history of diabetes mellitus 2023     Patients mother  Failed early 1 hr GTT, passed early 3 hour GTT       Supervision of normal pregnancy 10/24/2023     - Discussed updated COVID precautions and policies. Reviewed updated visitor policy. Encouraged social distancing and appropriate hand washing/hygiene practices. Reviewed symptoms suspicious for COVID infection. Discussed that ACOG, SMFM, and the CDC recommend to not withold immunization in pregnant and breastfeeding women who meet criteria for receipt of the vaccine based on the ACIP recommended priority groups. All questions answered. Patient vocalized understanding.    - RSV vaccination (32-36 weeks): The patient was counseled on benefits to her baby if she receives the Pfizer RSV vaccine (Abrysvo) during pregnancy. She was informed that this vaccination is FDA approved for use during pregnancy. She will think about it and call local pharmacies       GBS swab obtained  Discussed s/sx that should prompt call to the office  Discussed kick counts  Pt counseled to continue PNVs  RTC in 1 wks    Amy Mccarty MD

## 2024-03-06 ENCOUNTER — HOSPITAL ENCOUNTER (OUTPATIENT)
Age: 34
Setting detail: SPECIMEN
Discharge: HOME | End: 2024-03-06

## 2024-03-06 ENCOUNTER — ROUTINE PRENATAL (OUTPATIENT)
Dept: OBGYN CLINIC | Age: 34
End: 2024-03-06

## 2024-03-06 VITALS
BODY MASS INDEX: 32.44 KG/M2 | HEART RATE: 81 BPM | DIASTOLIC BLOOD PRESSURE: 76 MMHG | SYSTOLIC BLOOD PRESSURE: 106 MMHG | WEIGHT: 189 LBS

## 2024-03-06 DIAGNOSIS — Z3A.36 36 WEEKS GESTATION OF PREGNANCY: ICD-10-CM

## 2024-03-06 DIAGNOSIS — Z3A.36 36 WEEKS GESTATION OF PREGNANCY: Primary | ICD-10-CM

## 2024-03-06 DIAGNOSIS — Z34.03 ENCOUNTER FOR SUPERVISION OF NORMAL FIRST PREGNANCY IN THIRD TRIMESTER: ICD-10-CM

## 2024-03-06 PROCEDURE — 0502F SUBSEQUENT PRENATAL CARE: CPT | Performed by: OBSTETRICS & GYNECOLOGY

## 2024-03-09 LAB
MICROORGANISM SPEC CULT: NORMAL
SPECIMEN DESCRIPTION: NORMAL

## 2024-03-12 NOTE — PROGRESS NOTES
Gisela is a  @ 37w5d who presents for TIANA visit.  She denies LOF, VB or Ctxs.  + FM.  She says she is just waiting and is feeling some BH ctxs and pelvic pressure.  She denies any fevers/chills, SOB, cough, sore throat, loss of taste/smell or sick contacts.  Pt denies any HA, vision changes or RUQ pain.     O:  Vitals:    24 0826   BP: 124/78   Pulse: 75     Gen: NAD  Abd: soft, nontender, gravid  Ext:  no edema      BP: 124/78  Weight - Scale: 85.7 kg (189 lb)  Pulse: 75  Patient Position: Sitting  Fundal Height (cm): 37 cm  Fetal HR: 130  Movement: Present    A/P:  Patient Active Problem List    Diagnosis Date Noted    Family history of G6PD 2023     Priority: Low     OB EDC: 24  FH and pt desires testing at  MFM:referral placed 23      Family history of diabetes mellitus 2023     Patients mother  Failed early 1 hr GTT, passed early 3 hour GTT       Supervision of normal pregnancy 10/24/2023     - Discussed updated COVID precautions and policies. Reviewed updated visitor policy. Encouraged social distancing and appropriate hand washing/hygiene practices. Reviewed symptoms suspicious for COVID infection. Discussed that ACOG, SMFM, and the CDC recommend to not withold immunization in pregnant and breastfeeding women who meet criteria for receipt of the vaccine based on the ACIP recommended priority groups. All questions answered. Patient vocalized understanding.    - RSV vaccination (32-36 weeks): The patient was counseled on benefits to her baby if she receives the Pfizer RSV vaccine (Abrysvo) during pregnancy. She was informed that this vaccination is FDA approved for use during pregnancy. She will think about it and call local pharmacies       Discussed s/sx that should prompt call to the office  Discussed kick counts  Pt counseled to continue PNVs  RTC in 1 wks    Amy Mccarty MD

## 2024-03-13 ENCOUNTER — ROUTINE PRENATAL (OUTPATIENT)
Dept: OBGYN CLINIC | Age: 34
End: 2024-03-13

## 2024-03-13 VITALS
BODY MASS INDEX: 32.44 KG/M2 | DIASTOLIC BLOOD PRESSURE: 78 MMHG | SYSTOLIC BLOOD PRESSURE: 124 MMHG | HEART RATE: 75 BPM | WEIGHT: 189 LBS

## 2024-03-13 DIAGNOSIS — Z34.03 ENCOUNTER FOR SUPERVISION OF NORMAL FIRST PREGNANCY IN THIRD TRIMESTER: ICD-10-CM

## 2024-03-13 DIAGNOSIS — Z3A.37 37 WEEKS GESTATION OF PREGNANCY: Primary | ICD-10-CM

## 2024-03-13 PROCEDURE — 0502F SUBSEQUENT PRENATAL CARE: CPT | Performed by: OBSTETRICS & GYNECOLOGY

## 2024-03-18 NOTE — PROGRESS NOTES
Prenatal Visit    Gisela Girard is a 34 y.o. female  at 38w5d IUP    The patient was seen and evaluated. Partner here for appointment.  She has no complaints today. She has felt increased pelvic pressure the last few days. There was positive fetal movements. She denies consistent contractions, vaginal bleeding and leakage of fluid. She currently denies any signs or symptoms of pre-eclampsia which include headache, vision changes, RUQ pain. Signs and symptoms of labor were reviewed.     Allergies:  Latex    Vitals:  BP: 130/83  Weight - Scale: 86.2 kg (190 lb)  Pulse: 71  Patient Position: Sitting  Fundal Height (cm): 38 cm  Fetal HR: 131  Movement: Present  Dilation (cm): 1  Effacement: 70  Station: -3     Physical:   General appearance: no apparent distress, alert and cooperative  HEENT: head atraumatic, normocephalic, trachea midline, moist mucous membranes   Neurologic: alert, oriented, normal speech   Lungs: no increased work of breathing,   Abdomen: soft, gravid, non-tender on palpation    Musculoskeletal: no gross abnormalities, range of motion appropriate for age   Psychiatric: mood appropriate, normal affect      Assessment & Plan:  Gisela Girard is a 34 y.o. female  at 38w5d IUP   - VSS     - First blood pressure was elevated repeat was normotensive. Patient admits she was talking when first pressure was being taken. She has no s/s of preeclampsia    - She has a cuff at home and will check her pressures through out the day. Discussed BP parameters and if she has any additional elevated ones   to present to L&D for evaluation. She will return to office tomorrow for a blood pressure check    - Cervix checked today per patient request and noted 1 cm, 70% effaced, -3 fetal station, anterior, very soft    - Platypelloid pelvis noted on today's exam. Explained she can still attempt vaginal delivery but we will closely monitor fetal descent into pelvis. Reviewed c-sections    - GBS negative 3/6/24

## 2024-03-20 ENCOUNTER — ROUTINE PRENATAL (OUTPATIENT)
Dept: OBGYN CLINIC | Age: 34
End: 2024-03-20

## 2024-03-20 ENCOUNTER — TELEPHONE (OUTPATIENT)
Dept: OBGYN CLINIC | Age: 34
End: 2024-03-20

## 2024-03-20 VITALS
HEART RATE: 71 BPM | WEIGHT: 190 LBS | SYSTOLIC BLOOD PRESSURE: 130 MMHG | DIASTOLIC BLOOD PRESSURE: 83 MMHG | BODY MASS INDEX: 32.61 KG/M2

## 2024-03-20 DIAGNOSIS — Z3A.38 38 WEEKS GESTATION OF PREGNANCY: ICD-10-CM

## 2024-03-20 DIAGNOSIS — O16.3 ELEVATED BLOOD PRESSURE AFFECTING PREGNANCY IN THIRD TRIMESTER, ANTEPARTUM: ICD-10-CM

## 2024-03-20 DIAGNOSIS — O09.93 HIGH-RISK PREGNANCY IN THIRD TRIMESTER: Primary | ICD-10-CM

## 2024-03-20 PROCEDURE — 0502F SUBSEQUENT PRENATAL CARE: CPT | Performed by: STUDENT IN AN ORGANIZED HEALTH CARE EDUCATION/TRAINING PROGRAM

## 2024-03-20 NOTE — TELEPHONE ENCOUNTER
Pt is filing disability through [employer], and they called requesting information on further detail of pt being out of work prior to delivering.    Advised would seek out further detail of what is able to be release. Offered if they could fax a request over of information in which they are looking for would be better suited.     Is this information I can release how do we go about releasing this information.

## 2024-03-21 ENCOUNTER — NURSE ONLY (OUTPATIENT)
Dept: OBGYN CLINIC | Age: 34
End: 2024-03-21

## 2024-03-21 VITALS — DIASTOLIC BLOOD PRESSURE: 70 MMHG | WEIGHT: 190.6 LBS | SYSTOLIC BLOOD PRESSURE: 104 MMHG | BODY MASS INDEX: 32.72 KG/M2

## 2024-03-21 DIAGNOSIS — Z3A.38 38 WEEKS GESTATION OF PREGNANCY: Primary | ICD-10-CM

## 2024-03-21 DIAGNOSIS — Z83.3 FAMILY HISTORY OF DIABETES MELLITUS: ICD-10-CM

## 2024-03-21 DIAGNOSIS — O16.3 ELEVATED BLOOD PRESSURE AFFECTING PREGNANCY IN THIRD TRIMESTER, ANTEPARTUM: ICD-10-CM

## 2024-03-21 DIAGNOSIS — Z83.49 FAMILY HISTORY OF G6PD: ICD-10-CM

## 2024-03-23 ENCOUNTER — HOSPITAL ENCOUNTER (INPATIENT)
Age: 34
LOS: 3 days | Discharge: HOME | End: 2024-03-26
Attending: OBSTETRICS & GYNECOLOGY | Admitting: OBSTETRICS & GYNECOLOGY
Payer: COMMERCIAL

## 2024-03-23 ENCOUNTER — ANESTHESIA (OUTPATIENT)
Dept: LABOR AND DELIVERY | Age: 34
End: 2024-03-23
Payer: COMMERCIAL

## 2024-03-23 ENCOUNTER — APPOINTMENT (OUTPATIENT)
Dept: LABOR AND DELIVERY | Age: 34
End: 2024-03-23
Payer: COMMERCIAL

## 2024-03-23 ENCOUNTER — ANESTHESIA EVENT (OUTPATIENT)
Dept: LABOR AND DELIVERY | Age: 34
End: 2024-03-23
Payer: COMMERCIAL

## 2024-03-23 PROBLEM — Z3A.39 39 WEEKS GESTATION OF PREGNANCY: Status: ACTIVE | Noted: 2024-03-23

## 2024-03-23 LAB
ABO + RH BLD: NORMAL
ALBUMIN SERPL-MCNC: 3.9 G/DL (ref 3.5–5.2)
ALBUMIN/GLOB SERPL: 1 {RATIO} (ref 1–2.5)
ALP SERPL-CCNC: 202 U/L (ref 35–104)
ALT SERPL-CCNC: 6 U/L (ref 10–35)
AMPHET UR QL SCN: NEGATIVE
ANION GAP SERPL CALCULATED.3IONS-SCNC: 14 MMOL/L (ref 9–16)
ARM BAND NUMBER: NORMAL
AST SERPL-CCNC: 28 U/L (ref 10–35)
BARBITURATES UR QL SCN: NEGATIVE
BASOPHILS # BLD: 0.03 K/UL (ref 0–0.2)
BASOPHILS # BLD: <0.03 K/UL (ref 0–0.2)
BASOPHILS NFR BLD: 0 % (ref 0–2)
BASOPHILS NFR BLD: 0 % (ref 0–2)
BENZODIAZ UR QL: NEGATIVE
BILIRUB SERPL-MCNC: 0.3 MG/DL (ref 0–1.2)
BLOOD BANK SAMPLE EXPIRATION: NORMAL
BLOOD GROUP ANTIBODIES SERPL: NEGATIVE
BUN SERPL-MCNC: 10 MG/DL (ref 6–20)
CALCIUM SERPL-MCNC: 9.5 MG/DL (ref 8.6–10.4)
CANNABINOIDS UR QL SCN: NEGATIVE
CHLORIDE SERPL-SCNC: 100 MMOL/L (ref 98–107)
CO2 SERPL-SCNC: 21 MMOL/L (ref 20–31)
COCAINE UR QL SCN: NEGATIVE
CREAT SERPL-MCNC: 0.6 MG/DL (ref 0.5–0.9)
CREAT UR-MCNC: 214 MG/DL (ref 28–217)
EOSINOPHIL # BLD: 0.07 K/UL (ref 0–0.44)
EOSINOPHIL # BLD: 0.07 K/UL (ref 0–0.44)
EOSINOPHILS RELATIVE PERCENT: 0 % (ref 1–4)
EOSINOPHILS RELATIVE PERCENT: 1 % (ref 1–4)
ERYTHROCYTE [DISTWIDTH] IN BLOOD BY AUTOMATED COUNT: 13.6 % (ref 11.8–14.4)
ERYTHROCYTE [DISTWIDTH] IN BLOOD BY AUTOMATED COUNT: 13.6 % (ref 11.8–14.4)
FENTANYL UR QL: NEGATIVE
GFR SERPL CREATININE-BSD FRML MDRD: >60 ML/MIN/1.73M2
GLUCOSE SERPL-MCNC: 87 MG/DL (ref 74–99)
HCT VFR BLD AUTO: 34.8 % (ref 36.3–47.1)
HCT VFR BLD AUTO: 35.6 % (ref 36.3–47.1)
HGB BLD-MCNC: 11.4 G/DL (ref 11.9–15.1)
HGB BLD-MCNC: 11.8 G/DL (ref 11.9–15.1)
IMM GRANULOCYTES # BLD AUTO: 0.04 K/UL (ref 0–0.3)
IMM GRANULOCYTES # BLD AUTO: 0.09 K/UL (ref 0–0.3)
IMM GRANULOCYTES NFR BLD: 0 %
IMM GRANULOCYTES NFR BLD: 1 %
LYMPHOCYTES NFR BLD: 1.49 K/UL (ref 1.1–3.7)
LYMPHOCYTES NFR BLD: 1.75 K/UL (ref 1.1–3.7)
LYMPHOCYTES RELATIVE PERCENT: 10 % (ref 24–43)
LYMPHOCYTES RELATIVE PERCENT: 13 % (ref 24–43)
MCH RBC QN AUTO: 29.8 PG (ref 25.2–33.5)
MCH RBC QN AUTO: 30.7 PG (ref 25.2–33.5)
MCHC RBC AUTO-ENTMCNC: 32.8 G/DL (ref 28.4–34.8)
MCHC RBC AUTO-ENTMCNC: 33.1 G/DL (ref 28.4–34.8)
MCV RBC AUTO: 91.1 FL (ref 82.6–102.9)
MCV RBC AUTO: 92.7 FL (ref 82.6–102.9)
METHADONE UR QL: NEGATIVE
MONOCYTES NFR BLD: 0.86 K/UL (ref 0.1–1.2)
MONOCYTES NFR BLD: 1.21 K/UL (ref 0.1–1.2)
MONOCYTES NFR BLD: 7 % (ref 3–12)
MONOCYTES NFR BLD: 7 % (ref 3–12)
NEUTROPHILS NFR BLD: 79 % (ref 36–65)
NEUTROPHILS NFR BLD: 82 % (ref 36–65)
NEUTS SEG NFR BLD: 14.3 K/UL (ref 1.5–8.1)
NEUTS SEG NFR BLD: 9.3 K/UL (ref 1.5–8.1)
NRBC BLD-RTO: 0 PER 100 WBC
NRBC BLD-RTO: 0 PER 100 WBC
OPIATES UR QL SCN: NEGATIVE
OXYCODONE UR QL SCN: NEGATIVE
PCP UR QL SCN: NEGATIVE
PLATELET # BLD AUTO: 443 K/UL (ref 138–453)
PLATELET # BLD AUTO: 449 K/UL (ref 138–453)
PMV BLD AUTO: 9.2 FL (ref 8.1–13.5)
PMV BLD AUTO: 9.2 FL (ref 8.1–13.5)
POTASSIUM SERPL-SCNC: 4 MMOL/L (ref 3.7–5.3)
PROT SERPL-MCNC: 7.7 G/DL (ref 6.6–8.7)
RBC # BLD AUTO: 3.82 M/UL (ref 3.95–5.11)
RBC # BLD AUTO: 3.84 M/UL (ref 3.95–5.11)
SODIUM SERPL-SCNC: 135 MMOL/L (ref 136–145)
T PALLIDUM AB SER QL IA: NONREACTIVE
TEST INFORMATION: NORMAL
TOTAL PROTEIN, URINE: 69 MG/DL
URINE TOTAL PROTEIN CREATININE RATIO: 0.32
WBC OTHER # BLD: 11.8 K/UL (ref 3.5–11.3)
WBC OTHER # BLD: 17.4 K/UL (ref 3.5–11.3)

## 2024-03-23 PROCEDURE — 3700000025 EPIDURAL BLOCK: Performed by: STUDENT IN AN ORGANIZED HEALTH CARE EDUCATION/TRAINING PROGRAM

## 2024-03-23 PROCEDURE — 2500000003 HC RX 250 WO HCPCS: Performed by: NURSE ANESTHETIST, CERTIFIED REGISTERED

## 2024-03-23 PROCEDURE — 84156 ASSAY OF PROTEIN URINE: CPT

## 2024-03-23 PROCEDURE — 6370000000 HC RX 637 (ALT 250 FOR IP)

## 2024-03-23 PROCEDURE — 80053 COMPREHEN METABOLIC PANEL: CPT

## 2024-03-23 PROCEDURE — 86900 BLOOD TYPING SEROLOGIC ABO: CPT

## 2024-03-23 PROCEDURE — 80307 DRUG TEST PRSMV CHEM ANLYZR: CPT

## 2024-03-23 PROCEDURE — 1220000000 HC SEMI PRIVATE OB R&B

## 2024-03-23 PROCEDURE — 2580000003 HC RX 258

## 2024-03-23 PROCEDURE — 36415 COLL VENOUS BLD VENIPUNCTURE: CPT

## 2024-03-23 PROCEDURE — 86850 RBC ANTIBODY SCREEN: CPT

## 2024-03-23 PROCEDURE — 86901 BLOOD TYPING SEROLOGIC RH(D): CPT

## 2024-03-23 PROCEDURE — 6360000002 HC RX W HCPCS

## 2024-03-23 PROCEDURE — 6360000002 HC RX W HCPCS: Performed by: STUDENT IN AN ORGANIZED HEALTH CARE EDUCATION/TRAINING PROGRAM

## 2024-03-23 PROCEDURE — 85025 COMPLETE CBC W/AUTO DIFF WBC: CPT

## 2024-03-23 PROCEDURE — 82570 ASSAY OF URINE CREATININE: CPT

## 2024-03-23 PROCEDURE — 86780 TREPONEMA PALLIDUM: CPT

## 2024-03-23 RX ORDER — ONDANSETRON 2 MG/ML
4 INJECTION INTRAMUSCULAR; INTRAVENOUS EVERY 6 HOURS PRN
Status: DISCONTINUED | OUTPATIENT
Start: 2024-03-23 | End: 2024-03-24

## 2024-03-23 RX ORDER — SODIUM CHLORIDE, SODIUM LACTATE, POTASSIUM CHLORIDE, AND CALCIUM CHLORIDE .6; .31; .03; .02 G/100ML; G/100ML; G/100ML; G/100ML
500 INJECTION, SOLUTION INTRAVENOUS PRN
Status: DISCONTINUED | OUTPATIENT
Start: 2024-03-23 | End: 2024-03-24

## 2024-03-23 RX ORDER — SODIUM CHLORIDE 0.9 % (FLUSH) 0.9 %
5-40 SYRINGE (ML) INJECTION EVERY 12 HOURS SCHEDULED
Status: DISCONTINUED | OUTPATIENT
Start: 2024-03-23 | End: 2024-03-24

## 2024-03-23 RX ORDER — DIPHENHYDRAMINE HCL 25 MG
25 TABLET ORAL EVERY 4 HOURS PRN
Status: DISCONTINUED | OUTPATIENT
Start: 2024-03-23 | End: 2024-03-24

## 2024-03-23 RX ORDER — DIPHENHYDRAMINE HYDROCHLORIDE 50 MG/ML
25 INJECTION INTRAMUSCULAR; INTRAVENOUS EVERY 4 HOURS PRN
Status: DISCONTINUED | OUTPATIENT
Start: 2024-03-23 | End: 2024-03-24

## 2024-03-23 RX ORDER — LIDOCAINE HYDROCHLORIDE 10 MG/ML
INJECTION, SOLUTION EPIDURAL; INFILTRATION; INTRACAUDAL; PERINEURAL PRN
Status: DISCONTINUED | OUTPATIENT
Start: 2024-03-23 | End: 2024-03-24 | Stop reason: SDUPTHER

## 2024-03-23 RX ORDER — EPHEDRINE SULFATE 50 MG/ML
5 INJECTION INTRAVENOUS EVERY 5 MIN PRN
Status: DISCONTINUED | OUTPATIENT
Start: 2024-03-23 | End: 2024-03-24

## 2024-03-23 RX ORDER — SODIUM CHLORIDE, SODIUM LACTATE, POTASSIUM CHLORIDE, CALCIUM CHLORIDE 600; 310; 30; 20 MG/100ML; MG/100ML; MG/100ML; MG/100ML
INJECTION, SOLUTION INTRAVENOUS CONTINUOUS
Status: DISCONTINUED | OUTPATIENT
Start: 2024-03-23 | End: 2024-03-24

## 2024-03-23 RX ORDER — SODIUM CHLORIDE 9 MG/ML
25 INJECTION, SOLUTION INTRAVENOUS PRN
Status: DISCONTINUED | OUTPATIENT
Start: 2024-03-23 | End: 2024-03-24

## 2024-03-23 RX ORDER — NALOXONE HYDROCHLORIDE 0.4 MG/ML
INJECTION, SOLUTION INTRAMUSCULAR; INTRAVENOUS; SUBCUTANEOUS PRN
Status: DISCONTINUED | OUTPATIENT
Start: 2024-03-23 | End: 2024-03-24

## 2024-03-23 RX ORDER — ACETAMINOPHEN 500 MG
1000 TABLET ORAL EVERY 6 HOURS PRN
Status: DISCONTINUED | OUTPATIENT
Start: 2024-03-23 | End: 2024-03-24

## 2024-03-23 RX ORDER — SODIUM CHLORIDE 0.9 % (FLUSH) 0.9 %
5-40 SYRINGE (ML) INJECTION PRN
Status: DISCONTINUED | OUTPATIENT
Start: 2024-03-23 | End: 2024-03-24

## 2024-03-23 RX ORDER — SODIUM CHLORIDE, SODIUM LACTATE, POTASSIUM CHLORIDE, AND CALCIUM CHLORIDE .6; .31; .03; .02 G/100ML; G/100ML; G/100ML; G/100ML
1000 INJECTION, SOLUTION INTRAVENOUS PRN
Status: DISCONTINUED | OUTPATIENT
Start: 2024-03-23 | End: 2024-03-24

## 2024-03-23 RX ORDER — ROPIVACAINE HYDROCHLORIDE 2 MG/ML
INJECTION, SOLUTION EPIDURAL; INFILTRATION; PERINEURAL
Status: COMPLETED
Start: 2024-03-23 | End: 2024-03-23

## 2024-03-23 RX ORDER — LIDOCAINE HYDROCHLORIDE AND EPINEPHRINE 15; 5 MG/ML; UG/ML
INJECTION, SOLUTION EPIDURAL PRN
Status: DISCONTINUED | OUTPATIENT
Start: 2024-03-23 | End: 2024-03-24 | Stop reason: SDUPTHER

## 2024-03-23 RX ADMIN — LIDOCAINE HYDROCHLORIDE 5 ML: 10 INJECTION, SOLUTION EPIDURAL; INFILTRATION; INTRACAUDAL; PERINEURAL at 22:53

## 2024-03-23 RX ADMIN — Medication 1 MILLI-UNITS/MIN: at 21:30

## 2024-03-23 RX ADMIN — Medication 25 MCG: at 13:15

## 2024-03-23 RX ADMIN — ROPIVACAINE HYDROCHLORIDE 10 ML/HR: 2 INJECTION, SOLUTION EPIDURAL; INFILTRATION at 23:07

## 2024-03-23 RX ADMIN — ROPIVACAINE HYDROCHLORIDE 5 ML: 2 INJECTION, SOLUTION EPIDURAL; INFILTRATION at 23:06

## 2024-03-23 RX ADMIN — LIDOCAINE HYDROCHLORIDE,EPINEPHRINE BITARTRATE 5 ML: 15; .005 INJECTION, SOLUTION EPIDURAL; INFILTRATION; INTRACAUDAL; PERINEURAL at 23:00

## 2024-03-23 RX ADMIN — SODIUM CHLORIDE, POTASSIUM CHLORIDE, SODIUM LACTATE AND CALCIUM CHLORIDE: 600; 310; 30; 20 INJECTION, SOLUTION INTRAVENOUS at 12:38

## 2024-03-23 RX ADMIN — Medication 25 MCG: at 17:21

## 2024-03-23 NOTE — PROGRESS NOTES
Ob/Gyn Resident Tracing Note:     Tracing reviewed from [id number]    Baseline: 130  Variability: moderate   Accelerations: present  Decelerations: absent    Tracing reassuring and appropriate for GA. Motta balloon still in place, will order Cytotec 25 mcg PO x1 now. Continue to monitor.       Dorinda Morris DO  OB/GYN Resident, PGY1  [facility]  3/23/2024 6:14 PM

## 2024-03-23 NOTE — PROGRESS NOTES
Labor Progress Note    Gisela Girard is a 34 y.o. female  at 39w1d  The patient was seen and examined. Her pain is well controlled. She reports fetal movement is present, complains of contractions, denies loss of fluid, denies vaginal bleeding.       Vital Signs:  Vitals:    24 1216 24 1245 24 1721   BP: 127/70  122/69   Pulse: 76  72   Resp: 16  17   Temp: 98.3 °F (36.8 °C)  98.2 °F (36.8 °C)   TempSrc: Oral  Oral   SpO2: 91%     Weight:  86.2 kg (190 lb)    Height:  1.626 m (5' 4\")          FHT: 130, moderate variability, accelerations present, decelerations absent  Contractions: irregular    Chaperone for Intimate Exam: Chaperone was present for entire exam, Chaperone Name: HARDY Araiza  Cervical Exam: 4 cm dilated, 50 effaced, -1 station    Membranes: Intact  Scalp Electrode in place: absent  Intrauterine Pressure Catheter in Place: absent    Interventions: none    Assessment/Plan:  Gisela Girard is a 34 y.o. female  at 39w1d admitted for RR IOL   - GBS negative, No indication for GBS prophylaxis   - VSS   -SVE: /-1   -cEFM/TOCO: category 1   -s/p charles balloon, s/p Cytotec 25 mcg PO x2   -Discussed R/B/A of AROM now versus waiting before or after epidural. Patient would like to wait some time before AROM, undecided if she wants to wait until after epidural. She is nervous about not being able to sit still during the epidural if she is in too much pain   -Continue current management      Attending updated and in agreement with plan    Dorinda Morris DO  Ob/Gyn Resident  3/23/2024, 7:09 PM     09-Feb-2023 16:51

## 2024-03-23 NOTE — DISCHARGE SUMMARY
Obstetric Discharge Summary  [facility]    Patient Name: Gisela Girard  Patient : 1990  Primary Care Physician: No primary care provider on file.  Admit Date: 3/23/2024    Principal Diagnosis: IUP at 39w1d, admitted for RR IOL     Her pregnancy has been complicated by:   Patient Active Problem List   Diagnosis    Family history of G6PD    Supervision of normal pregnancy    Family history of diabetes mellitus     3/24/24 F Apg 8/9 Wt 6#11    Preeclampsia w/o SF (G1)       Infection Present?: No  Hospital Acquired: N/A    Surgical Operations & Procedures:  Analgesia: epidural  Delivery Type: Spontaneous Vaginal Delivery: See Labor and Delivery Summary   Laceration(s): First degree laceration.  Suture used for repair:  Vicryl 3.0. Right sulcal laceration repaired with 3-0 Vicryl    Consultations: Anesthesia    Pertinent Findings & Procedures:   Gisela Girard is a 34 y.o. female  at 39w1d admitted for RR IOL; received Motta balloon, Cytotec 25 mcg PO x2, Pitocin, Epidural, SROM, IUPC, amnioinfusion.     She delivered by spontaneous vaginal a Live Born infant on 3/24/24.     PreE labs were obtained are were wnl, P/C 0.32. Patient diagnosed with PreE without SF. BP controlled without medication.     Information for the patient's :  Juan Girard [[id number]]   female   Birth Weight: 3.06 kg (6 lb 11.9 oz)     Apgars: 8 at 1 minute and 9 at 5 minutes.     Postpartum course: normal.    PPD#1: Normal, BP stable  PPD#2: Stable for DC.    Course of patient: complicated by new diagnosis of Preeclampsia without severe features    Discharge to: Home    Readmission planned: no     Indication for 6 week PP 2 hour GTT?: no     Eligible for 2 week PP virtual visit? no - private patient    Recommendations on Discharge:     Medications:      Medication List        START taking these medications      acetaminophen 500 MG tablet  Commonly known as: TYLENOL  Take 1 tablet by mouth 4

## 2024-03-23 NOTE — CARE COORDINATION
ANTEPARTUM NOTE    39 weeks gestation of pregnancy [Z3A.39]    Gisela  was admitted to L&D on 3/23/24 for RR IOL @ 39 W 1 D    OB GYN Provider: Dr. Mccarty    Will meet with patient after delivery to verify name/address/phone/insurance and discuss discharge planning.     Anticipate DC home 2 nights after vaginal delivery or 4 nights after C/S delivery as long as hemodynamically stable.

## 2024-03-23 NOTE — PROGRESS NOTES
Pt ambulates onto L&D unit.  Pt states that she is here for an elective induction of labor.  Pt changes into gown, gives urine sample.  Education provided on call light system.  Pt rates contraction pain 4/10, pt states that contractions are not regular and that she is feeling them every 30 mins-1hour.  Positive fetal movement.  Pt denies leaking of fluid.  Pt states that she had brown vaginal discharge starting this morning after she uses the restroom.

## 2024-03-23 NOTE — H&P
OBSTETRICAL HISTORY AND PHYSICAL  [facility]    Date: 3/23/2024       Time: 12:10 PM   Patient Name: Gisela Girard     Patient : 1990  Room/Bed: 0704/0704-01    Admission Date/Time: 3/23/2024 11:56 AM      CC: Risk reducing IOL     HPI: Gisela Girard is a 34 y.o.  at 39w1d who presents for risk reducing induction of labor. The patient reports fetal movement is present, denies contractions, denies loss of fluid, denies vaginal bleeding. Patient denies headache, vision changes, nausea, vomiting, fever, chills, shortness of breath, chest pain, RUQ pain, abdominal pain, diarrhea, change in color/amount/odor of vaginal discharge, dysuria or, hematuria.       DATING:  LMP: Patient's last menstrual period was 2023 (exact date).  Estimated Date of Delivery: 3/29/24   Based on: LMP on 23    PREGNANCY RISK FACTORS:  Patient Active Problem List   Diagnosis    Family history of G6PD    Supervision of normal pregnancy    Family history of diabetes mellitus    Elevated blood pressure affecting pregnancy in third trimester, antepartum    39 weeks gestation of pregnancy        Steroids Given In This Pregnancy:  no     REVIEW OF SYSTEMS:   Constitutional: negative fever, negative chills, negative weight changes   HEENT: negative visual disturbances, negative headaches, negative dizziness, negative hearing loss  Breast: Negative breast abnormalities, negative breast lumps, negative nipple discharge  Respiratory: negative dyspnea, negative cough, negative SOB  Cardiovascular: negative chest pain,  negative palpitations, negative arrhythmia, negative syncope   Gastrointestinal: negative abdominal pain, negative RUQ pain, negative N/V, negative diarrhea, negative constipation, negative bowel changes, negative heartburn   Genitourinary: negative dysuria, negative hematuria, negative urinary incontinence, negative vaginal discharge, negative vaginal bleeding or

## 2024-03-24 PROBLEM — O14.90 PREECLAMPSIA: Status: ACTIVE | Noted: 2024-03-24

## 2024-03-24 PROBLEM — Z3A.39 39 WEEKS GESTATION OF PREGNANCY: Status: RESOLVED | Noted: 2024-03-23 | Resolved: 2024-03-24

## 2024-03-24 LAB
ALBUMIN SERPL-MCNC: 3.8 G/DL (ref 3.5–5.2)
ALBUMIN/GLOB SERPL: 1 {RATIO} (ref 1–2.5)
ALP SERPL-CCNC: 188 U/L (ref 35–104)
ALT SERPL-CCNC: 8 U/L (ref 10–35)
ANION GAP SERPL CALCULATED.3IONS-SCNC: 13 MMOL/L (ref 9–16)
AST SERPL-CCNC: 23 U/L (ref 10–35)
BILIRUB SERPL-MCNC: 0.3 MG/DL (ref 0–1.2)
BUN SERPL-MCNC: 9 MG/DL (ref 6–20)
CALCIUM SERPL-MCNC: 9.4 MG/DL (ref 8.6–10.4)
CHLORIDE SERPL-SCNC: 99 MMOL/L (ref 98–107)
CO2 SERPL-SCNC: 21 MMOL/L (ref 20–31)
CREAT SERPL-MCNC: 0.6 MG/DL (ref 0.5–0.9)
GFR SERPL CREATININE-BSD FRML MDRD: >60 ML/MIN/1.73M2
GLUCOSE SERPL-MCNC: 108 MG/DL (ref 74–99)
POTASSIUM SERPL-SCNC: 3.9 MMOL/L (ref 3.7–5.3)
PROT SERPL-MCNC: 7.4 G/DL (ref 6.6–8.7)
SODIUM SERPL-SCNC: 133 MMOL/L (ref 136–145)

## 2024-03-24 PROCEDURE — 1220000000 HC SEMI PRIVATE OB R&B

## 2024-03-24 PROCEDURE — 6370000000 HC RX 637 (ALT 250 FOR IP)

## 2024-03-24 PROCEDURE — 3E033VJ INTRODUCTION OF OTHER HORMONE INTO PERIPHERAL VEIN, PERCUTANEOUS APPROACH: ICD-10-PCS | Performed by: OBSTETRICS & GYNECOLOGY

## 2024-03-24 PROCEDURE — 3E0DXGC INTRODUCTION OF OTHER THERAPEUTIC SUBSTANCE INTO MOUTH AND PHARYNX, EXTERNAL APPROACH: ICD-10-PCS | Performed by: OBSTETRICS & GYNECOLOGY

## 2024-03-24 PROCEDURE — 0KQM0ZZ REPAIR PERINEUM MUSCLE, OPEN APPROACH: ICD-10-PCS | Performed by: OBSTETRICS & GYNECOLOGY

## 2024-03-24 PROCEDURE — 7200000001 HC VAGINAL DELIVERY

## 2024-03-24 PROCEDURE — 59400 OBSTETRICAL CARE: CPT | Performed by: OBSTETRICS & GYNECOLOGY

## 2024-03-24 PROCEDURE — 2580000003 HC RX 258

## 2024-03-24 RX ORDER — SODIUM CHLORIDE 0.9 % (FLUSH) 0.9 %
5-40 SYRINGE (ML) INJECTION PRN
Status: DISCONTINUED | OUTPATIENT
Start: 2024-03-24 | End: 2024-03-26 | Stop reason: HOSPADM

## 2024-03-24 RX ORDER — SIMETHICONE 80 MG
80 TABLET,CHEWABLE ORAL EVERY 6 HOURS PRN
Status: DISCONTINUED | OUTPATIENT
Start: 2024-03-24 | End: 2024-03-26 | Stop reason: HOSPADM

## 2024-03-24 RX ORDER — ACETAMINOPHEN 500 MG
1000 TABLET ORAL EVERY 6 HOURS SCHEDULED
Status: DISCONTINUED | OUTPATIENT
Start: 2024-03-24 | End: 2024-03-26 | Stop reason: HOSPADM

## 2024-03-24 RX ORDER — BISACODYL 10 MG
10 SUPPOSITORY, RECTAL RECTAL DAILY PRN
Status: DISCONTINUED | OUTPATIENT
Start: 2024-03-24 | End: 2024-03-26 | Stop reason: HOSPADM

## 2024-03-24 RX ORDER — ACETAMINOPHEN 500 MG
500 TABLET ORAL 4 TIMES DAILY PRN
Qty: 30 TABLET | Refills: 1 | Status: SHIPPED | OUTPATIENT
Start: 2024-03-24

## 2024-03-24 RX ORDER — ONDANSETRON 4 MG/1
4 TABLET, ORALLY DISINTEGRATING ORAL EVERY 6 HOURS PRN
Status: DISCONTINUED | OUTPATIENT
Start: 2024-03-24 | End: 2024-03-26 | Stop reason: HOSPADM

## 2024-03-24 RX ORDER — ONDANSETRON 2 MG/ML
4 INJECTION INTRAMUSCULAR; INTRAVENOUS EVERY 6 HOURS PRN
Status: DISCONTINUED | OUTPATIENT
Start: 2024-03-24 | End: 2024-03-26 | Stop reason: HOSPADM

## 2024-03-24 RX ORDER — LANOLIN 72 %
OINTMENT (GRAM) TOPICAL PRN
Status: DISCONTINUED | OUTPATIENT
Start: 2024-03-24 | End: 2024-03-26 | Stop reason: HOSPADM

## 2024-03-24 RX ORDER — SODIUM CHLORIDE 9 MG/ML
INJECTION, SOLUTION INTRAVENOUS PRN
Status: DISCONTINUED | OUTPATIENT
Start: 2024-03-24 | End: 2024-03-26 | Stop reason: HOSPADM

## 2024-03-24 RX ORDER — SODIUM CHLORIDE 0.9 % (FLUSH) 0.9 %
5-40 SYRINGE (ML) INJECTION EVERY 12 HOURS SCHEDULED
Status: DISCONTINUED | OUTPATIENT
Start: 2024-03-24 | End: 2024-03-26 | Stop reason: HOSPADM

## 2024-03-24 RX ORDER — IBUPROFEN 600 MG/1
600 TABLET ORAL EVERY 6 HOURS
Status: DISCONTINUED | OUTPATIENT
Start: 2024-03-24 | End: 2024-03-26 | Stop reason: HOSPADM

## 2024-03-24 RX ORDER — SENNOSIDES A AND B 8.6 MG/1
1 TABLET, FILM COATED ORAL 2 TIMES DAILY
Qty: 60 TABLET | Refills: 1 | Status: SHIPPED | OUTPATIENT
Start: 2024-03-24 | End: 2024-04-23

## 2024-03-24 RX ORDER — DOCUSATE SODIUM 100 MG/1
100 CAPSULE, LIQUID FILLED ORAL 2 TIMES DAILY
Status: DISCONTINUED | OUTPATIENT
Start: 2024-03-24 | End: 2024-03-26 | Stop reason: HOSPADM

## 2024-03-24 RX ORDER — ONDANSETRON 2 MG/ML
4 INJECTION INTRAMUSCULAR; INTRAVENOUS EVERY 4 HOURS PRN
Status: DISCONTINUED | OUTPATIENT
Start: 2024-03-24 | End: 2024-03-26 | Stop reason: HOSPADM

## 2024-03-24 RX ORDER — IBUPROFEN 600 MG/1
600 TABLET ORAL EVERY 6 HOURS PRN
Qty: 30 TABLET | Refills: 1 | Status: SHIPPED | OUTPATIENT
Start: 2024-03-24

## 2024-03-24 RX ADMIN — IBUPROFEN 600 MG: 600 TABLET, FILM COATED ORAL at 12:58

## 2024-03-24 RX ADMIN — ACETAMINOPHEN 1000 MG: 500 TABLET ORAL at 08:11

## 2024-03-24 RX ADMIN — SODIUM CHLORIDE, PRESERVATIVE FREE 10 ML: 5 INJECTION INTRAVENOUS at 08:11

## 2024-03-24 RX ADMIN — DOCUSATE SODIUM 100 MG: 100 CAPSULE, LIQUID FILLED ORAL at 08:11

## 2024-03-24 RX ADMIN — DOCUSATE SODIUM 100 MG: 100 CAPSULE, LIQUID FILLED ORAL at 21:38

## 2024-03-24 RX ADMIN — IBUPROFEN 600 MG: 600 TABLET, FILM COATED ORAL at 21:37

## 2024-03-24 RX ADMIN — SODIUM CHLORIDE, PRESERVATIVE FREE 10 ML: 5 INJECTION INTRAVENOUS at 21:38

## 2024-03-24 RX ADMIN — IBUPROFEN 600 MG: 600 TABLET, FILM COATED ORAL at 05:08

## 2024-03-24 ASSESSMENT — PAIN DESCRIPTION - LOCATION
LOCATION: PERINEUM
LOCATION: ABDOMEN
LOCATION: ABDOMEN
LOCATION: PERINEUM

## 2024-03-24 ASSESSMENT — PAIN DESCRIPTION - ORIENTATION
ORIENTATION: RIGHT;LOWER
ORIENTATION: LOWER;MID

## 2024-03-24 ASSESSMENT — PAIN DESCRIPTION - DESCRIPTORS
DESCRIPTORS: DISCOMFORT
DESCRIPTORS: CRAMPING
DESCRIPTORS: ACHING;DISCOMFORT
DESCRIPTORS: DISCOMFORT

## 2024-03-24 ASSESSMENT — PAIN SCALES - GENERAL
PAINLEVEL_OUTOF10: 5
PAINLEVEL_OUTOF10: 6
PAINLEVEL_OUTOF10: 4
PAINLEVEL_OUTOF10: 6

## 2024-03-24 ASSESSMENT — PAIN - FUNCTIONAL ASSESSMENT: PAIN_FUNCTIONAL_ASSESSMENT: ACTIVITIES ARE NOT PREVENTED

## 2024-03-24 NOTE — ANESTHESIA PROCEDURE NOTES
Epidural Block    Patient location during procedure: OB  Reason for block: labor epidural  Staffing  Performed: resident/CRNA   Anesthesiologist: Jack Maldonado MD  Resident/CRNA: Juwan Williamson APRN - CRNA  Performed by: Juwan Williamson APRN - CRNA  Authorized by: Jack Maldonado MD    Epidural  Patient position: sitting  Prep: Betadine  Patient monitoring: continuous pulse ox and frequent blood pressure checks  Approach: midline  Location: L3-4  Injection technique: MACK air  Guidance: paresthesia technique  Provider prep: mask and sterile gloves  Needle  Needle type: Tuohy   Needle gauge: 17 G  Needle length: 3.5 in  Needle insertion depth: 8 cm  Catheter type: end hole  Catheter size: 19 G  Catheter at skin depth: 14 cm  Test dose: negativeCatheter Secured: tegaderm and tape  Assessment  Sensory level: T8  Hemodynamics: stable  Attempts: 1  Outcomes: uncomplicated  Preanesthetic Checklist  Completed: patient identified, IV checked, site marked, risks and benefits discussed, surgical/procedural consents, equipment checked, pre-op evaluation, timeout performed, anesthesia consent given, oxygen available, monitors applied/VS acknowledged, fire risk safety assessment completed and verbalized and blood product R/B/A discussed and consented

## 2024-03-24 NOTE — LACTATION NOTE
Pt states she has been able to latch and feed well x 2 recently. She reports the right is easier to latch compared to left. She states she will try on the left the next time. Reviewed tips to latch to left breast.

## 2024-03-24 NOTE — LACTATION NOTE
Breastfeeding education given and reviewed. Encouraged pt to call out for breastfeeding assistance as needed. Reviewed normal  feeding patterns and nursing positions, encouraged skin to skin and offering the breast a minimum of every 3 hours.

## 2024-03-24 NOTE — CARE COORDINATION
CASE MANAGEMENT POST-PARTUM TRANSITIONAL CARE PLAN    39 weeks gestation of pregnancy [Z3A.39]    OB Provider: Dr. Mccarty    Writer met w/ Gisela  at her bedside to discuss DCP. She is S/P   on 3/24/24    Writer verified address/phone number correct on facesheet. She states she lives with her / Trish. Gisela  denied barriers with transportation home, to doctors appointments or with paying for medications upon discharge home.     [facility]  insurance correct. Writer notified Gisela she has  30 days from date of birth to add  to insurance policy. She  verbalized understanding.    Gisela confirmed a safe place for infant to sleep at home.    Infant name on BC: Sarah Prather      Infant PCP Dr. Gunn.     DME: B/P cuff      HOME CARE: none    Anticipate discharge home of couplet      Readmission Risk              Risk of Unplanned Readmission:  5

## 2024-03-24 NOTE — PROGRESS NOTES
Labor Progress Note    Gisela Girard is a 34 y.o. female  at 39w1d  The patient was seen and examined. She has her epidural but still has a significant amount of back pain. She reports fetal movement is present, complains of contractions,she also reports a gush of fluid, denies vaginal bleeding.       Vital Signs:  Vitals:    24 1216 24 1245 24 1721 24 2133   BP: 127/70  122/69 (Abnormal) 142/75   Pulse: 76  72 74   Resp: 16  17 16   Temp: 98.3 °F (36.8 °C)  98.2 °F (36.8 °C) 97.4 °F (36.3 °C)   TempSrc: Oral  Oral    SpO2: 91%   99%   Weight:  86.2 kg (190 lb)     Height:  1.626 m (5' 4\")           FHT: 135 moderate variability, accelerations present, decelerations absent  Contractions: difficult to trace but appear every 2 minutes    Chaperone for Intimate Exam: Chaperone was present for entire exam, Chaperone Name: HARDY Araiza  Cervical Exam: 5 cm dilated, 50 effaced, -1 station  Pitocin: 6 mu/min    Membranes: SROM confirmed with positive Nitrazine @ 2240  Scalp Electrode in place: absent  Intrauterine Pressure Catheter in Place: absent    Interventions: none    Assessment/Plan:  Gisela Girard is a 34 y.o. female  at 39w1d admitted for RR IOL   - GBS negative, No indication for GBS prophylaxis   - Bps elevated   -SVE: /-1   -cEFM/TOCO: category 1   -SROM @ 2240 clear fluid   -Patient met criteria for gHTN at 2133, PreE labs drawn and patient now meets criteria for Preeclampsia based on P/C 0.32   -s/p charles balloon, s/p Cytotec 25 mcg PO x2   -Epidural in place   -Pitocin per protocol   -Continue current management      Attending updated and in agreement with plan    Dorinda Morris DO  Ob/Gyn Resident  3/23/2024, 11:51 PM

## 2024-03-24 NOTE — PROGRESS NOTES
Obstetric/Gynecology Resident Interval Note    Writer to bedside at 0036 due to late versus early deceleration unable to discern due to inconsistent TOCO tracing. Patient is lying on her right side, SVE performed showing 5/70/0, decision made to place IUPC. Patient positioned onto her back with deceleration waldemar to 95 bpm. Patient repositioned onto her left side and IVF bolus started. With next contraction, patient had variable deceleration waldemar to 95 bpm, patient repositioned onto her right side and decision made to halve pitocin. Tracing improved with some early decelerations at 0052, if continues, will run amnioinfusion.     Dorinda Morris DO  OB/GYN Resident, PGY1  [place], [place]  3/24/2024, 12:46 AM

## 2024-03-24 NOTE — DISCHARGE INSTRUCTIONS
During your admission, your blood pressures were noted to be high and you were diagnosed preeclampsia (high blood pressure associated with pregnancy). We recommend seeing your OB doctor in 3 days to check your blood pressure to make sure it is not too high. We will provide a blood pressure cuff to check your blood pressures at home when you are discharged.     Please present to nearest emergency room if you have a headache that does not go away with medications, chest pain, shortness of breath or pain under your right ribcage, vision changes.

## 2024-03-24 NOTE — L&D DELIVERY NOTE
Juan Girard [[id number]]      Labor Events     Labor: No   Steroids: None  Cervical Ripening Date/Time:      Antibiotics Received during Labor: No  Rupture Identifier: Sac 1  Rupture Date/Time:  3/23/24 22:40:00   Rupture Type: SROM  Fluid Color: Clear  Fluid Odor: None  Fluid Volume: Moderate  Induction: Cervical Ripening Balloon, Misoprostol, Oxytocin  Augmentation: None  Labor Complications: None              Anesthesia    Method: Epidural       Labor Event Times      Labor onset date/time:        Dilation complete date/time:        Start pushing date/time:  3/24/2024 01:25:00   Decision date/time (emergent ):            Delivery Details      Delivery Date: 3/24/24 Delivery Time: 02:18:00   Delivery Type: Vaginal, Spontaneous              [place] Presentation    Presentation: Vertex  Position: Left  _: Occiput  _: Anterior       Shoulder Dystocia    Shoulder Dystocia Present?: No       Assisted Delivery Details    Forceps Attempted?: No  Vacuum Extractor Attempted?: No                           Cord    Vessels: 3 Vessels  Complications: None  Delayed Cord Clamping?: Yes  Cord Clamped Date/Time: 3/24/2024 02:19:00  Cord Blood Disposition: Lab  Gases Sent?: Yes              Placenta    Date/Time: 3/24/2024 02:22:00  Removal: Spontaneous  Appearance: Intact  Disposition: Discarded       Lacerations    Episiotomy: None  Perineal Lacerations: 1st  Other Lacerations: no non-perineal laceration  Number of Repair Packets: 1       Vaginal Counts    Initial Count Personnel: ELICEO  Intial Sponge Count: Correct Intial Needles Count: Correct Intial Instruments Count: Correct   Final Sponges Count: Correct Final Needles  Count: Correct Final Instruments Count: Correct   Final Count Personnel: CINDY  Final Count Verified By: ELICEO  Accurate Final Count?: Yes       Blood Loss  Mother: Gisela Girard #[id number]     Start of Mother's Information      Delivery Blood Loss  24 1418 -

## 2024-03-24 NOTE — ANESTHESIA PRE PROCEDURE
Encounters:   03/23/24 86.2 kg (190 lb)   03/21/24 86.5 kg (190 lb 9.6 oz)   03/20/24 86.2 kg (190 lb)     Body mass index is 32.61 kg/m².    CBC:   Lab Results   Component Value Date/Time    WBC 17.4 03/23/2024 10:11 PM    RBC 3.84 03/23/2024 10:11 PM    HGB 11.8 03/23/2024 10:11 PM    HCT 35.6 03/23/2024 10:11 PM    MCV 92.7 03/23/2024 10:11 PM    RDW 13.6 03/23/2024 10:11 PM     03/23/2024 10:11 PM       CMP:   Lab Results   Component Value Date/Time    GLUCOSE 131 09/20/2023 09:43 AM       POC Tests: No results for input(s): \"POCGLU\", \"POCNA\", \"POCK\", \"POCCL\", \"POCBUN\", \"POCHEMO\", \"POCHCT\" in the last 72 hours.    Coags: No results found for: \"PROTIME\", \"INR\", \"APTT\"    HCG (If Applicable): No results found for: \"PREGTESTUR\", \"PREGSERUM\", \"HCG\", \"HCGQUANT\"     ABGs: No results found for: \"PHART\", \"PO2ART\", \"LLV4FRN\", \"ZYX8RLR\", \"BEART\", \"L7QCNKFI\"     Type & Screen (If Applicable):  No results found for: \"LABABO\", \"LABRH\"    Drug/Infectious Status (If Applicable):  Lab Results   Component Value Date/Time    HEPCAB NONREACTIVE 09/05/2023 12:15 PM       COVID-19 Screening (If Applicable): No results found for: \"COVID19\"        Anesthesia Evaluation  Patient summary reviewed and Nursing notes reviewed   no history of anesthetic complications:   Airway: Mallampati: II  TM distance: >3 FB   Neck ROM: full  Mouth opening: > = 3 FB   Dental: normal exam         Pulmonary:normal exam    (+)           asthma:                            Cardiovascular:Negative CV ROS            Rhythm: regular  Rate: normal                    Neuro/Psych:   (+) neuromuscular disease:            GI/Hepatic/Renal: Neg GI/Hepatic/Renal ROS            Endo/Other: Negative Endo/Other ROS                    Abdominal: normal exam            Vascular: negative vascular ROS.         Other Findings: Past Medical History:  No date: Asthma      Comment:  resolved child  No date: Endometriosis  2023: Neurologic disorder      Comment:  sciatic

## 2024-03-24 NOTE — ANESTHESIA POSTPROCEDURE EVALUATION
Department of Anesthesiology  Postprocedure Note    Patient: Gisela Girard  MRN: [id number]  YOB: 1990  Date of evaluation: 3/24/2024    Procedure Summary       Date: 03/23/24 Room / Location:     Anesthesia Start: 2248 Anesthesia Stop: 03/24/24 0218    Procedure: Labor Analgesia Diagnosis:     Scheduled Providers:  Responsible Provider: Jack Maldonado MD    Anesthesia Type: epidural ASA Status: 2            Anesthesia Type: No value filed.    Mika Phase I: Mika Score: 9    Mika Phase II:      Anesthesia Post Evaluation    Patient location during evaluation: floor  Patient participation: complete - patient participated  Level of consciousness: awake and alert  Pain score: 0  Airway patency: patent  Nausea & Vomiting: no nausea and no vomiting  Cardiovascular status: blood pressure returned to baseline  Respiratory status: acceptable  Hydration status: euvolemic  Pain management: adequate        No notable events documented.

## 2024-03-24 NOTE — ANESTHESIA PRE PROCEDURE
Department of Anesthesiology  Preprocedure Note       Name:  Gisela Girard   Age:  34 y.o.  :  1990                                          MRN:  [id number]         Date:  3/23/2024      Surgeon: * No surgeons listed *    Procedure: * No procedures listed *    Medications prior to admission:   Prior to Admission medications    Medication Sig Start Date End Date Taking? Authorizing Provider   Prenatal Vit-Fe Fumarate-FA (PRENATAL VITAMIN PO) Take 1 tablet by mouth daily    Darren Lara MD   docusate sodium (COLACE) 100 MG capsule Take 1 capsule by mouth daily as needed for Constipation  Patient not taking: Reported on 3/20/2024    Provider, MD Darren       Current medications:    Current Facility-Administered Medications   Medication Dose Route Frequency Provider Last Rate Last Admin    lactated ringers IV soln infusion   IntraVENous Continuous Dorinda Morris  mL/hr at 24 1238 New Bag at 24 1238    lactated ringers bolus 500 mL  500 mL IntraVENous PRN Dorinda Morris, DO        Or    lactated ringers bolus 1,000 mL  1,000 mL IntraVENous PRN Dorinda Morris, DO        sodium chloride flush 0.9 % injection 5-40 mL  5-40 mL IntraVENous 2 times per day Dorinda Morris, DO        sodium chloride flush 0.9 % injection 5-40 mL  5-40 mL IntraVENous PRN Dorinda Morris, DO        0.9 % sodium chloride infusion  25 mL IntraVENous PRN Dorinda Morris, DO        diphenhydrAMINE (BENADRYL) tablet 25 mg  25 mg Oral Q4H PRN Dorinda Morris, DO        Or    diphenhydrAMINE (BENADRYL) injection 25 mg  25 mg IntraVENous Q4H PRN Dorinda Morris, DO        acetaminophen (TYLENOL) tablet 1,000 mg  1,000 mg Oral Q6H PRN Dorinda Morris, DO        benzocaine-menthol (DERMOPLAST) 20-0.5 % spray   Topical PRN Dorinda Morris, DO        oxytocin (PITOCIN) 30 units in 500 mL infusion  1-20 ronan-units/min IntraVENous Continuous Dorinda Morris, DO 1 mL/hr at

## 2024-03-24 NOTE — PROGRESS NOTES
Labor Progress Note    Gisela Girard is a 34 y.o. female  at 39w1d  The patient was seen and examined. She is feeling more pain in her back. She reports fetal movement is present, complains of contractions, denies loss of fluid, denies vaginal bleeding.       Vital Signs:  Vitals:    24 1216 24 1245 24 1721 24 2133   BP: 127/70  122/69 (Abnormal) 142/75   Pulse: 76  72 74   Resp: 16  17 16   Temp: 98.3 °F (36.8 °C)  98.2 °F (36.8 °C) 97.4 °F (36.3 °C)   TempSrc: Oral  Oral    SpO2: 91%   99%   Weight:  86.2 kg (190 lb)     Height:  1.626 m (5' 4\")           FHT: 130, moderate variability, accelerations present, decelerations absent  Contractions: irregular    Chaperone for Intimate Exam: Chaperone was present for entire exam, Chaperone Name: HARDY Araiza  Cervical Exam: 5 cm dilated, 50 effaced, -1 station    Membranes: Intact  Scalp Electrode in place: absent  Intrauterine Pressure Catheter in Place: absent    Interventions: none    Assessment/Plan:  Gisela Girard is a 34 y.o. female  at 39w1d admitted for RR IOL   - GBS negative, No indication for GBS prophylaxis   - VSS   -SVE: /-1   -cEFM/TOCO: category 1   -s/p charles balloon, s/p Cytotec 25 mcg PO x2   -Will start pitocin now   -Patient desires an epidural before AROM   -Continue current management      Attending updated and in agreement with plan    Dorinda Morris DO  Ob/Gyn Resident  3/23/2024, 9:39 PM

## 2024-03-25 PROCEDURE — 2580000003 HC RX 258

## 2024-03-25 PROCEDURE — 6370000000 HC RX 637 (ALT 250 FOR IP)

## 2024-03-25 PROCEDURE — 1220000000 HC SEMI PRIVATE OB R&B

## 2024-03-25 RX ADMIN — IBUPROFEN 600 MG: 600 TABLET, FILM COATED ORAL at 06:29

## 2024-03-25 RX ADMIN — ACETAMINOPHEN 1000 MG: 500 TABLET ORAL at 06:29

## 2024-03-25 RX ADMIN — DOCUSATE SODIUM 100 MG: 100 CAPSULE, LIQUID FILLED ORAL at 09:56

## 2024-03-25 RX ADMIN — DOCUSATE SODIUM 100 MG: 100 CAPSULE, LIQUID FILLED ORAL at 23:17

## 2024-03-25 RX ADMIN — ACETAMINOPHEN 1000 MG: 500 TABLET ORAL at 00:20

## 2024-03-25 RX ADMIN — IBUPROFEN 600 MG: 600 TABLET, FILM COATED ORAL at 23:17

## 2024-03-25 RX ADMIN — SODIUM CHLORIDE, PRESERVATIVE FREE 10 ML: 5 INJECTION INTRAVENOUS at 09:56

## 2024-03-25 RX ADMIN — SODIUM CHLORIDE, PRESERVATIVE FREE 10 ML: 5 INJECTION INTRAVENOUS at 23:17

## 2024-03-25 ASSESSMENT — PAIN - FUNCTIONAL ASSESSMENT
PAIN_FUNCTIONAL_ASSESSMENT: ACTIVITIES ARE NOT PREVENTED

## 2024-03-25 ASSESSMENT — PAIN SCALES - GENERAL
PAINLEVEL_OUTOF10: 7
PAINLEVEL_OUTOF10: 5
PAINLEVEL_OUTOF10: 8
PAINLEVEL_OUTOF10: 0

## 2024-03-25 ASSESSMENT — PAIN DESCRIPTION - LOCATION
LOCATION: ABDOMEN

## 2024-03-25 ASSESSMENT — PAIN DESCRIPTION - DESCRIPTORS
DESCRIPTORS: CRAMPING

## 2024-03-25 ASSESSMENT — PAIN DESCRIPTION - ORIENTATION
ORIENTATION: LOWER;MID
ORIENTATION: LOWER
ORIENTATION: LOWER;MID

## 2024-03-25 NOTE — PROGRESS NOTES
POST PARTUM DAY # 1    Gisela Girard is a 34 y.o. female  This patient was seen & examined today.     Her pregnancy was complicated by:   Patient Active Problem List   Diagnosis    Family history of G6PD    Supervision of normal pregnancy    Family history of diabetes mellitus     3/24/24 F Aph 8/ Wt 6#11    Preeclampsia w/o SF (G1)       Today she is doing well without any chief complaint. Her lochia is light. She denies chest pain. She is  breast feeding and she denies any breast tenderness. She is ambulating well. Her voiding pattern is normal. I reviewed signs and symptoms of post partum depression with the patient, she currently denies any of these symptoms. She is tolerating solids.     Vital Signs:  Vitals:    24 0805 24 1600 24 2137 24 0020   BP: 104/64 116/71 123/71 115/65   Pulse: 88 86 92 85   Resp: 16 18 17 16   Temp: 98.5 °F (36.9 °C) 98 °F (36.7 °C) 98.7 °F (37.1 °C) 98.4 °F (36.9 °C)   TempSrc: Oral Oral Oral Oral   SpO2: 97%  98% 97%   Weight:       Height:             Physical Exam:  General:  awake, alert, cooperative, no apparent distress, and appears stated age, no apparent distress, alert, and cooperative  Neurologic:  alert, oriented, normal speech, no focal findings or movement disorder noted  Lungs:  No increased work of breathing, good air exchange, clear to auscultation bilaterally, no crackles or wheezing  Heart:  Normal apical impulse, regular rate and rhythm, normal S1 and S2, no S3 or S4, and no murmur noted and regular rate and rhythm    Abdomen: Abdomen soft, non-tender. BS normal. No masses,  No organomegaly, abdomen soft, non-distended, non-tender  Fundus: non-tender, normal size, firm, below umbilicus  Extremities:  edema: 1+ affecting bilateral foot, no calf tenderness, non edematous    Lab:  Lab Results   Component Value Date    HGB 11.8 (L) 2024     Lab Results   Component Value Date    HCT 35.6 (L) 2024       Assessment/Plan:  Gisela

## 2024-03-25 NOTE — PLAN OF CARE
Problem: Vaginal Birth or  Section  Goal: Fetal and maternal status remain reassuring during the birth process  Description:  Birth OB-Pregnancy care plan goal which identifies if the fetal and maternal status remain reassuring during the birth process  Outcome: Progressing     Problem: Pain  Goal: Verbalizes/displays adequate comfort level or baseline comfort level  Outcome: Progressing     Problem: Postpartum  Goal: Experiences normal postpartum course  Description:  Postpartum OB-Pregnancy care plan goal which identifies if the mother is experiencing a normal postpartum course  Outcome: Progressing  Goal: Appropriate maternal -  bonding  Description:  Postpartum OB-Pregnancy care plan goal which identifies if the mother and  are bonding appropriately  Outcome: Progressing  Goal: Establishment of infant feeding pattern  Description:  Postpartum OB-Pregnancy care plan goal which identifies if the mother is establishing a feeding pattern with their   Outcome: Progressing  Goal: Incisions, wounds, or drain sites healing without S/S of infection  Outcome: Progressing     Problem: Infection - Adult  Goal: Absence of infection at discharge  Outcome: Progressing  Goal: Absence of infection during hospitalization  Outcome: Progressing  Goal: Absence of fever/infection during anticipated neutropenic period  Outcome: Progressing     Problem: Safety - Adult  Goal: Free from fall injury  Outcome: Progressing     Problem: Discharge Planning  Goal: Discharge to home or other facility with appropriate resources  Outcome: Progressing

## 2024-03-25 NOTE — PROGRESS NOTES
CLINICAL PHARMACY NOTE: MEDS TO BEDS    Total # of Prescriptions Filled: 3   The following medications were delivered to the patient:  Senna 8.6mg  Acetaminophen 500mg  Ibuprofen 600mg    Additional Documentation: delivered to patient in room 747 3/25 at 2:20pm. Co-pay $12.84 clover.

## 2024-03-25 NOTE — LACTATION NOTE
Per mother baby has been cluster feeding since 0500. Reviewed normal  feeding patterns and encouraged frequent feeds at the breast. Encouraged pt to call out for assistance or a latch check as needed. Latch is comfortable per mother. Baby came off the breast while writer was in room, no abnormalities in shape of the nipple after the feed.

## 2024-03-25 NOTE — CARE COORDINATION
Social Work     Sw reviewed medical record (current active problem list) and tox screens and found no current concerns.     Sw spoke with mom and fob briefly to explain Sw role, inquire if any needs or concerns, and provide safe sleep education and discuss.  Mom denied any needs or questions and informs baby has a safe sleep environment (bassinet).     Mom denied any current s/s of anxiety or depression and is aware to reach out to OB if any s/s occur after dc.     Mom reports a good support system with friends, and dad states his mom is coming to stay with them until May, and denied any current questions or needs.      Mom reports this is her 1st baby.       Mom states ped will beDr Perch.    Mom bonding and breastfeeding, dad supportive.        Sw encouraged parents to reach out if any issues or concerns arise.

## 2024-03-26 VITALS
HEIGHT: 64 IN | BODY MASS INDEX: 32.44 KG/M2 | RESPIRATION RATE: 16 BRPM | SYSTOLIC BLOOD PRESSURE: 105 MMHG | TEMPERATURE: 98.4 F | WEIGHT: 190 LBS | OXYGEN SATURATION: 97 % | HEART RATE: 73 BPM | DIASTOLIC BLOOD PRESSURE: 56 MMHG

## 2024-03-26 NOTE — LACTATION NOTE
Mom reports onset of her supply. Mom began putting baby to breast. Reminded her to put baby on her side facing mom. Baby latched on the right breast in cradle hold. Mom noted it was tender. Bo baby's lips out on breast and mom reported it was comfortable. Breastfeeding discharge reviewed including feeding patterns, how to know baby is getting enough at breast, and comfort measures for engorgement. LC visit offered. Mom will call as needed.

## 2024-03-26 NOTE — PROGRESS NOTES
POST PARTUM DAY # 2    Gisela Girard is a 34 y.o. female  This patient was seen & examined today.     Her pregnancy was complicated by:   Patient Active Problem List   Diagnosis    Family history of G6PD    Supervision of normal pregnancy    Family history of diabetes mellitus     3/24/24 F Apg / Wt 6#11    Preeclampsia w/o SF (G1)       Today she is doing well without any chief complaint. Her lochia is light. She denies chest pain, shortness of breath, headache, lightheadedness, and blurred vision. She is  breast feeding and she denies any breast tenderness. She is ambulating well. Her voiding pattern is normal. I reviewed signs and symptoms of post partum depression with the patient, she currently denies any of these symptoms. She is tolerating solids.     Vital Signs:  Vitals:    24   BP: 121/60   Pulse: 86   Resp: 15   Temp: 98.2 °F (36.8 °C)   SpO2: 98%       Physical Exam:  General:  awake, alert, cooperative, no apparent distress, and appears stated age, no apparent distress, alert, and cooperative  Neurologic:  alert, oriented, normal speech, no focal findings or movement disorder noted  Lungs:  No increased work of breathing, good air exchange, clear to auscultation bilaterally, no crackles or wheezing  Heart:  Normal apical impulse, regular rate and rhythm, normal S1 and S2, no S3 or S4, and no murmur noted and regular rate and rhythm    Abdomen: Abdomen soft, non-tender. BS normal. No masses,  No organomegaly, abdomen soft, non-distended, non-tender  Fundus: non-tender, normal size, firm, below umbilicus  Extremities:  no calf tenderness, non edematous    Lab:  Lab Results   Component Value Date    HGB 11.8 (L) 2024     Lab Results   Component Value Date    HCT 35.6 (L) 2024       Assessment/Plan:  Gisela Girard is a  PPD # 2 s/p    - Doing well, VSS              - female infant in General care nursery              - Encourage ambulation              -

## 2024-03-26 NOTE — FLOWSHEET NOTE
I have reviewed all [facility] Post-Birth Warning Signs and essential teaching points for pulmonary embolism, cardiac disease, hypertensive disorders of pregnancy, obstetric hemorrhage, venous thromboembolism, infection, and postpartum depression with the patient and husbandID bands checked. Discharge teaching complete, discharge instructions signed, & parent/guardian denies questions regarding infant care at time of discharge.  Parents  verbalized understanding to follow-up with the pediatrician or family physician as  recommended on the discharge instructions.  Mother verbalizes understanding to follow-up with baby’s care provider as instructed.  Discharged in stable  condition to care of parents. Infant placed in rear facing car seat per parents. (support person) . I have informed the patient on when to call their healthcare provider and when to call 911. I have discussed with the patient  the importance of scheduling a follow-up visit with their physician, nurse practitioner or midwife and provided them with correct contact information for appointment. I have provided the patient with a copy of the \"Save Your Life\" handout. The patient has acknowledged receiving and understanding this education with her signature. Discharged to home per ambulatory with spouse and baby in car seat.

## 2024-03-26 NOTE — PLAN OF CARE
Problem: Vaginal Birth or  Section  Goal: Fetal and maternal status remain reassuring during the birth process  Description:  Birth OB-Pregnancy care plan goal which identifies if the fetal and maternal status remain reassuring during the birth process  Outcome: Adequate for Discharge     Problem: Pain  Goal: Verbalizes/displays adequate comfort level or baseline comfort level  Outcome: Adequate for Discharge     Problem: Postpartum  Goal: Experiences normal postpartum course  Description:  Postpartum OB-Pregnancy care plan goal which identifies if the mother is experiencing a normal postpartum course  Outcome: Adequate for Discharge  Goal: Appropriate maternal -  bonding  Description:  Postpartum OB-Pregnancy care plan goal which identifies if the mother and  are bonding appropriately  Outcome: Adequate for Discharge  Goal: Establishment of infant feeding pattern  Description:  Postpartum OB-Pregnancy care plan goal which identifies if the mother is establishing a feeding pattern with their   Outcome: Adequate for Discharge  Goal: Incisions, wounds, or drain sites healing without S/S of infection  Outcome: Adequate for Discharge     Problem: Infection - Adult  Goal: Absence of infection at discharge  Outcome: Adequate for Discharge  Goal: Absence of infection during hospitalization  Outcome: Adequate for Discharge  Goal: Absence of fever/infection during anticipated neutropenic period  Outcome: Adequate for Discharge     Problem: Safety - Adult  Goal: Free from fall injury  Outcome: Adequate for Discharge     Problem: Discharge Planning  Goal: Discharge to home or other facility with appropriate resources  Outcome: Adequate for Discharge

## 2024-03-26 NOTE — ANESTHESIA POSTPROCEDURE EVALUATION
Department of Anesthesiology  Postprocedure Note    Patient: Gisela Girard  MRN: [id number]  YOB: 1990  Date of evaluation: 3/26/2024    Procedure Summary       Date: 03/23/24 Room / Location: [facility]    Anesthesia Start:  Anesthesia Stop:     Procedures:       INDUCTION OF LABOR      Labor Analgesia Diagnosis:     Scheduled Providers:  Responsible Provider:     Anesthesia Type: epidural ASA Status: 2            Anesthesia Type: No value filed.    Mika Phase I: Mika Score: 9    Mika Phase II:      Anesthesia Post Evaluation    Patient location during evaluation: bedside  Patient participation: complete - patient participated  Level of consciousness: awake  Pain score: 2  Airway patency: patent  Nausea & Vomiting: no nausea and no vomiting  Cardiovascular status: blood pressure returned to baseline  Respiratory status: acceptable  Hydration status: euvolemic  Pain management: adequate        No notable events documented.

## 2024-03-29 ENCOUNTER — CARE COORDINATION (OUTPATIENT)
Dept: OTHER | Facility: CLINIC | Age: 34
End: 2024-03-29

## 2024-03-29 NOTE — CARE COORDINATION
Ambulatory Care Coordination Note    ACM attempted to reach patient for introduction to Associate Care Management related to PP follow-up. HIPAA compliant message left requesting a return phone call at patient convenience.     Plan for follow-up call in 3-5 days      Future Appointments   Date Time Provider Department Center   4/3/2024  3:00 PM Amy Mccarty MD [facility]   4/10/2024  3:15 PM Amy Mcc[facility] MD [facility]   5/1/2024 10:00 AM Amy Mccarty MD [facility]       DANICA Gan, RN  Associate Care Manager   Cell: [phone]  [email or web address]

## 2024-04-03 ENCOUNTER — POSTPARTUM VISIT (OUTPATIENT)
Dept: OBGYN CLINIC | Age: 34
End: 2024-04-03

## 2024-04-03 VITALS
DIASTOLIC BLOOD PRESSURE: 73 MMHG | SYSTOLIC BLOOD PRESSURE: 118 MMHG | HEIGHT: 64 IN | WEIGHT: 177 LBS | HEART RATE: 82 BPM | BODY MASS INDEX: 30.22 KG/M2

## 2024-04-03 PROCEDURE — 99024 POSTOP FOLLOW-UP VISIT: CPT | Performed by: OBSTETRICS & GYNECOLOGY

## 2024-04-03 NOTE — PROGRESS NOTES
[facility]  [address]  [address]  [place] [facility] [place] [address]  Dept: [phone]    Gisela Girard  4/3/2024  3:21 PM        Gisela Girard is a 34 y.o. female       The patient was seen. She has no chief complaints today. She delivered vaginally on 3/24/24. She is  breast feeding and there is not any signs or symptoms of mastitis.  The patient completed the E.P.D.S. Evaluation form and scored 6.  She does not have any signs or symptoms of post partum depression. She denies any suicidal thoughts with a plan, intent to harm others, and delusional ideas.   Today her lochia is light she denies any dizziness or shortness of breath.      Her pregnancy was complicated by:  Patient Active Problem List    Diagnosis Date Noted    Family history of G6PD 2023     Priority: Low     Overview Note:     OB EDC: 24  FH and pt desires testing at  MFM:referral placed 23  Pt's sister       3/24/24 F Apg 8/9 Wt 6#11 2024    Preeclampsia w/o SF (G1) 2024    Family history of diabetes mellitus 2023     Overview Note:     Patients mother  Failed early 1 hr GTT, passed early 3 hour GTT       Supervision of normal pregnancy 10/24/2023       She does admit to having good home support.      OB History    Para Term  AB Living   1 1 1 0 0 1   SAB IAB Ectopic Molar Multiple Live Births   0 0 0 0 0 1      # Outcome Date GA Lbr Adalberto/2nd Weight Sex Delivery Anes PTL Lv   1 Term 24 39w2d 07:23 / 00:55 3.06 kg (6 lb 11.9 oz) F Vag-Spont EPI N JOSE F      Name: TONIO,GIRL GISELA      Apgar1: 8  Apgar5: 9       Patient Active Problem List   Diagnosis    Family history of G6PD    Supervision of normal pregnancy    Family history of diabetes mellitus     3/24/24 F Apg 8/9 Wt 6#11    Preeclampsia w/o SF (G1)       Blood pressure 118/73, pulse 82, height 1.626 m (5' 4\"), weight 80.3 kg (177 lb), last

## 2024-04-24 ENCOUNTER — CARE COORDINATION (OUTPATIENT)
Dept: OTHER | Facility: CLINIC | Age: 34
End: 2024-04-24

## 2024-04-24 NOTE — CARE COORDINATION
6 weeks weeks PP. She denies any red-flag s/s or needs r/t DME, medications or appointments. She is agreeable to a follow-up call with ACM in 4 weeks and will call with any needs in the meantime.       DINH GanN, RN  Associate Care Manager   Cell: [phone]  [email or web address]

## 2024-04-30 NOTE — PROGRESS NOTES
[facility]  [address]  [address]  [place] [address]  Dept: [phone]    Gisela Elizalde  2024  10:26 AM        Gisela Elizalde is a 34 y.o. female       The patient was seen. She has no chief complaints today. She delivered vaginally on 3/24/24. She is  breast pumping and formula feeding and there is not any signs or symptoms of mastitis.  The patient completed the E.P.D.S. Evaluation form and scored 12.  She has been stressed with baby not feeding well and having to give formula.  She does not have any signs or symptoms of post partum depression. She denies any suicidal thoughts with a plan, intent to harm others, and delusional ideas.   She says her bleeding has stopped, but she did start a period yesterday.     Her pregnancy was complicated by:  Patient Active Problem List    Diagnosis Date Noted    Family history of G6PD 2023     Priority: Low     Overview Note:     OB EDC: 24  FH and pt desires testing at  MFM:referral placed 23  Pt's sister       3/24/24 F Apg 8/9 Wt 6#11 2024    Preeclampsia w/o SF (G1) 2024    Family history of diabetes mellitus 2023     Overview Note:     Patients mother  Failed early 1 hr GTT, passed early 3 hour GTT       Supervision of normal pregnancy 10/24/2023       She does admit to having good home support.      OB History    Para Term  AB Living   1 1 1 0 0 1   SAB IAB Ectopic Molar Multiple Live Births   0 0 0 0 0 1      # Outcome Date GA Lbr Adalberto/2nd Weight Sex Delivery Anes PTL Lv   1 Term 24 39w2d 07:23 / 00:55 3.06 kg (6 lb 11.9 oz) F Vag-Spont EPI N JOSE F      Name: LEANNE ELIZALDE      Apgar1: 8  Apgar5: 9       Patient Active Problem List   Diagnosis    Family history of G6PD    Supervision of normal pregnancy    Family history of diabetes mellitus     3/24/24 F Apg 8/9 Wt 6#11    Preeclampsia w/o SF (G1)

## 2024-05-01 ENCOUNTER — POSTPARTUM VISIT (OUTPATIENT)
Dept: OBGYN CLINIC | Age: 34
End: 2024-05-01

## 2024-05-01 VITALS
SYSTOLIC BLOOD PRESSURE: 108 MMHG | DIASTOLIC BLOOD PRESSURE: 66 MMHG | BODY MASS INDEX: 29.71 KG/M2 | HEART RATE: 75 BPM | HEIGHT: 64 IN | WEIGHT: 174 LBS

## 2024-05-01 PROCEDURE — 0503F POSTPARTUM CARE VISIT: CPT | Performed by: OBSTETRICS & GYNECOLOGY

## 2024-07-02 ENCOUNTER — CARE COORDINATION (OUTPATIENT)
Dept: OTHER | Facility: CLINIC | Age: 34
End: 2024-07-02

## 2024-07-02 NOTE — CARE COORDINATION
ACM reviewed this patient's records r/t PP CM. Patient and baby are doing great. No utilization since delivery. ACM will close program and remove self from CT r/t no ongoing needs. No further outreach scheduled with this ACM, ACM will sign off care team at this time. Program status updated to reflect closed.  Patient  has this ACM's contact information if future needs arise.      DANICA Gan, RN  Associate Care Manager   Cell: [phone]  [email or web address]

## 2025-04-16 LAB
CHOLEST SERPL-MCNC: 170 MG/DL (ref 0–199)
CHOLESTEROL/HDL RATIO: 2.7
GLUCOSE SERPL-MCNC: 87 MG/DL (ref 74–99)
HDLC SERPL-MCNC: 63 MG/DL
LDLC SERPL CALC-MCNC: 96 MG/DL (ref 0–100)
PATIENT FASTING?: YES
TRIGL SERPL-MCNC: 56 MG/DL
VLDLC SERPL CALC-MCNC: 11 MG/DL (ref 1–30)